# Patient Record
Sex: FEMALE | Race: WHITE | NOT HISPANIC OR LATINO
[De-identification: names, ages, dates, MRNs, and addresses within clinical notes are randomized per-mention and may not be internally consistent; named-entity substitution may affect disease eponyms.]

---

## 2018-04-18 ENCOUNTER — RECORDS - HEALTHEAST (OUTPATIENT)
Dept: ADMINISTRATIVE | Facility: OTHER | Age: 33
End: 2018-04-18

## 2018-04-27 ENCOUNTER — COMMUNICATION - HEALTHEAST (OUTPATIENT)
Dept: TELEHEALTH | Facility: CLINIC | Age: 33
End: 2018-04-27

## 2018-04-27 ENCOUNTER — HOSPITAL ENCOUNTER (OUTPATIENT)
Dept: MRI IMAGING | Facility: CLINIC | Age: 33
Discharge: HOME | End: 2018-04-27
Attending: PSYCHIATRY & NEUROLOGY

## 2018-04-27 DIAGNOSIS — G40.209 COMPLEX PARTIAL EPILEPSY WITHOUT STATUS EPILEPTICUS (H): ICD-10-CM

## 2018-07-08 ENCOUNTER — HOSPITAL ENCOUNTER (INPATIENT)
Dept: MEDSURG UNIT | Facility: CLINIC | Age: 33
Discharge: NURSING, REHAB OR LONG-TERM CARE | End: 2018-07-18
Attending: INTERNAL MEDICINE | Admitting: INTERNAL MEDICINE

## 2018-07-08 DIAGNOSIS — E87.1 HYPONATREMIA: ICD-10-CM

## 2018-07-08 DIAGNOSIS — A41.9 SEPSIS (H): ICD-10-CM

## 2018-07-08 DIAGNOSIS — R56.9 SEIZURES (H): ICD-10-CM

## 2018-07-08 DIAGNOSIS — N12 PYELONEPHRITIS: ICD-10-CM

## 2018-07-08 LAB
ABO/RH(D): NORMAL
ALBUMIN SERPL-MCNC: 3.5 G/DL (ref 3.5–5)
ALBUMIN UR-MCNC: ABNORMAL MG/DL
ALP SERPL-CCNC: 98 U/L (ref 45–120)
ALT SERPL W P-5'-P-CCNC: 15 U/L (ref 0–45)
AMPHETAMINES UR QL SCN: ABNORMAL
ANION GAP SERPL CALCULATED.3IONS-SCNC: 13 MMOL/L (ref 5–18)
ANTIBODY SCREEN: NEGATIVE
APPEARANCE UR: ABNORMAL
APTT PPP: 32 SECONDS (ref 24–37)
AST SERPL W P-5'-P-CCNC: 24 U/L (ref 0–40)
BACTERIA #/AREA URNS HPF: ABNORMAL HPF
BARBITURATES UR QL: ABNORMAL
BASOPHILS # BLD AUTO: 0 THOU/UL (ref 0–0.2)
BASOPHILS NFR BLD AUTO: 0 % (ref 0–2)
BENZODIAZ UR QL: ABNORMAL
BILIRUB SERPL-MCNC: 1.5 MG/DL (ref 0–1)
BILIRUB UR QL STRIP: NEGATIVE
BUN SERPL-MCNC: 9 MG/DL (ref 8–22)
CALCIUM SERPL-MCNC: 9.4 MG/DL (ref 8.5–10.5)
CANNABINOIDS UR QL SCN: ABNORMAL
CHLORIDE BLD-SCNC: 94 MMOL/L (ref 98–107)
CO2 SERPL-SCNC: 21 MMOL/L (ref 22–31)
COCAINE UR QL: ABNORMAL
COLOR UR AUTO: YELLOW
CREAT SERPL-MCNC: 0.78 MG/DL (ref 0.6–1.1)
CREAT UR-MCNC: 251.3 MG/DL
EOSINOPHIL # BLD AUTO: 0 THOU/UL (ref 0–0.4)
EOSINOPHIL NFR BLD AUTO: 0 % (ref 0–6)
ERYTHROCYTE [DISTWIDTH] IN BLOOD BY AUTOMATED COUNT: 13.2 % (ref 11–14.5)
ETHANOL SERPL-MCNC: <10 MG/DL
GFR SERPL CREATININE-BSD FRML MDRD: >60 ML/MIN/1.73M2
GLUCOSE BLD-MCNC: 140 MG/DL (ref 70–125)
GLUCOSE UR STRIP-MCNC: NEGATIVE MG/DL
HCG SERPL QL: NEGATIVE
HCT VFR BLD AUTO: 34.5 % (ref 35–47)
HGB BLD-MCNC: 11.7 G/DL (ref 12–16)
HGB UR QL STRIP: ABNORMAL
HYALINE CASTS #/AREA URNS LPF: ABNORMAL LPF
INR PPP: 1.24 (ref 0.9–1.1)
KETONES UR STRIP-MCNC: ABNORMAL MG/DL
LACTATE SERPL-SCNC: 0.6 MMOL/L (ref 0.5–2.2)
LACTATE SERPL-SCNC: 2.3 MMOL/L (ref 0.5–2.2)
LEUKOCYTE ESTERASE UR QL STRIP: NEGATIVE
LEVETIRACETAM (KEPPRA): <2 UG/ML (ref 6–46)
LIPASE SERPL-CCNC: <4 U/L (ref 0–52)
LYMPHOCYTES # BLD AUTO: 0.4 THOU/UL (ref 0.8–4.4)
LYMPHOCYTES NFR BLD AUTO: 3 % (ref 20–40)
MCH RBC QN AUTO: 29.6 PG (ref 27–34)
MCHC RBC AUTO-ENTMCNC: 33.9 G/DL (ref 32–36)
MCV RBC AUTO: 87 FL (ref 80–100)
METHADONE UR QL SCN: ABNORMAL
MONOCYTES # BLD AUTO: 0.3 THOU/UL (ref 0–0.9)
MONOCYTES NFR BLD AUTO: 3 % (ref 2–10)
MUCOUS THREADS #/AREA URNS LPF: ABNORMAL LPF
NEUTROPHILS # BLD AUTO: 10.5 THOU/UL (ref 2–7.7)
NEUTROPHILS NFR BLD AUTO: 94 % (ref 50–70)
NITRATE UR QL: NEGATIVE
OPIATES UR QL SCN: ABNORMAL
OXYCODONE UR QL: ABNORMAL
PCP UR QL SCN: ABNORMAL
PH UR STRIP: 6 [PH] (ref 4.5–8)
PLATELET # BLD AUTO: 133 THOU/UL (ref 140–440)
PMV BLD AUTO: 9.5 FL (ref 8.5–12.5)
POTASSIUM BLD-SCNC: 3.1 MMOL/L (ref 3.5–5)
PROCALCITONIN SERPL-MCNC: 4.1 NG/ML (ref 0–0.49)
PROT SERPL-MCNC: 7.8 G/DL (ref 6–8)
RBC # BLD AUTO: 3.95 MILL/UL (ref 3.8–5.4)
RBC #/AREA URNS AUTO: ABNORMAL HPF
SODIUM SERPL-SCNC: 128 MMOL/L (ref 136–145)
SP GR UR STRIP: 1.03 (ref 1–1.03)
SQUAMOUS #/AREA URNS AUTO: >100 LPF
UROBILINOGEN UR STRIP-ACNC: ABNORMAL
WBC #/AREA URNS AUTO: ABNORMAL HPF
WBC: 11.3 THOU/UL (ref 4–11)

## 2018-07-08 ASSESSMENT — MIFFLIN-ST. JEOR
SCORE: 1264.26
SCORE: 1250.2

## 2018-07-09 LAB
ALBUMIN SERPL-MCNC: 2.5 G/DL (ref 3.5–5)
ALP SERPL-CCNC: 83 U/L (ref 45–120)
ALT SERPL W P-5'-P-CCNC: 19 U/L (ref 0–45)
ANION GAP SERPL CALCULATED.3IONS-SCNC: 10 MMOL/L (ref 5–18)
AST SERPL W P-5'-P-CCNC: 46 U/L (ref 0–40)
ATRIAL RATE - MUSE: 143 BPM
BASOPHILS # BLD AUTO: 0 THOU/UL (ref 0–0.2)
BASOPHILS NFR BLD AUTO: 0 % (ref 0–2)
BILIRUB SERPL-MCNC: 1.1 MG/DL (ref 0–1)
BUN SERPL-MCNC: 8 MG/DL (ref 8–22)
CALCIUM SERPL-MCNC: 8 MG/DL (ref 8.5–10.5)
CHLORIDE BLD-SCNC: 101 MMOL/L (ref 98–107)
CO2 SERPL-SCNC: 20 MMOL/L (ref 22–31)
CREAT SERPL-MCNC: 0.62 MG/DL (ref 0.6–1.1)
DIASTOLIC BLOOD PRESSURE - MUSE: 84 MMHG
EOSINOPHIL COUNT (ABSOLUTE): 0 THOU/UL (ref 0–0.4)
EOSINOPHIL NFR BLD AUTO: 0 % (ref 0–6)
ERYTHROCYTE [DISTWIDTH] IN BLOOD BY AUTOMATED COUNT: 13.3 % (ref 11–14.5)
GFR SERPL CREATININE-BSD FRML MDRD: >60 ML/MIN/1.73M2
GLUCOSE BLD-MCNC: 103 MG/DL (ref 70–125)
HCT VFR BLD AUTO: 29 % (ref 35–47)
HGB BLD-MCNC: 9.6 G/DL (ref 12–16)
INTERPRETATION ECG - MUSE: NORMAL
LACTATE SERPL-SCNC: 0.7 MMOL/L (ref 0.5–2.2)
LYMPHOCYTES # BLD AUTO: 0.4 THOU/UL (ref 0.8–4.4)
LYMPHOCYTES NFR BLD AUTO: 5 % (ref 20–40)
MAGNESIUM SERPL-MCNC: 1.5 MG/DL (ref 1.8–2.6)
MCH RBC QN AUTO: 29.5 PG (ref 27–34)
MCHC RBC AUTO-ENTMCNC: 33.1 G/DL (ref 32–36)
MCV RBC AUTO: 89 FL (ref 80–100)
METAMYELOCYTES (ABSOLUTE): 0.3 THOU/UL
METAMYELOCYTES NFR BLD MANUAL: 4 %
MONOCYTES # BLD AUTO: 0.5 THOU/UL (ref 0–0.9)
MONOCYTES NFR BLD AUTO: 6 % (ref 2–10)
P AXIS - MUSE: 65 DEGREES
PLAT MORPH BLD: ABNORMAL
PLATELET # BLD AUTO: 97 THOU/UL (ref 140–440)
PMV BLD AUTO: 9.6 FL (ref 8.5–12.5)
POTASSIUM BLD-SCNC: 2.9 MMOL/L (ref 3.5–5)
POTASSIUM BLD-SCNC: 4.1 MMOL/L (ref 3.5–5)
PR INTERVAL - MUSE: 150 MS
PROT SERPL-MCNC: 5.9 G/DL (ref 6–8)
QRS DURATION - MUSE: 92 MS
QT - MUSE: 266 MS
QTC - MUSE: 410 MS
R AXIS - MUSE: -32 DEGREES
RBC # BLD AUTO: 3.25 MILL/UL (ref 3.8–5.4)
SODIUM SERPL-SCNC: 131 MMOL/L (ref 136–145)
SYSTOLIC BLOOD PRESSURE - MUSE: 133 MMHG
T AXIS - MUSE: 47 DEGREES
TOTAL NEUTROPHILS-ABS(DIFF): 7 THOU/UL (ref 2–7.7)
TOTAL NEUTROPHILS-REL(DIFF): 85 % (ref 50–70)
VENTRICULAR RATE- MUSE: 143 BPM
WBC: 8.2 THOU/UL (ref 4–11)

## 2018-07-10 LAB
ALBUMIN SERPL-MCNC: 2.4 G/DL (ref 3.5–5)
ALP SERPL-CCNC: 82 U/L (ref 45–120)
ALT SERPL W P-5'-P-CCNC: 31 U/L (ref 0–45)
ANION GAP SERPL CALCULATED.3IONS-SCNC: 7 MMOL/L (ref 5–18)
AORTIC ROOT: 2.9 CM
AORTIC VALVE MEAN VELOCITY: 74.8 CM/S
AST SERPL W P-5'-P-CCNC: 45 U/L (ref 0–40)
AV DIMENSIONLESS INDEX VTI: 0.9
AV MEAN GRADIENT: 3 MMHG
AV PEAK GRADIENT: 5.2 MMHG
AV VALVE AREA: 2.9 CM2
AV VELOCITY RATIO: 0.8
BACTERIA SPEC CULT: NORMAL
BASOPHILS # BLD AUTO: 0 THOU/UL (ref 0–0.2)
BASOPHILS NFR BLD AUTO: 0 % (ref 0–2)
BILIRUB DIRECT SERPL-MCNC: 0.2 MG/DL
BILIRUB SERPL-MCNC: 0.4 MG/DL (ref 0–1)
BSA FOR ECHO PROCEDURE: 1.6 M2
BUN SERPL-MCNC: 6 MG/DL (ref 8–22)
C REACTIVE PROTEIN LHE: 15 MG/DL (ref 0–0.8)
CALCIUM SERPL-MCNC: 8.3 MG/DL (ref 8.5–10.5)
CHLORIDE BLD-SCNC: 109 MMOL/L (ref 98–107)
CO2 SERPL-SCNC: 21 MMOL/L (ref 22–31)
CREAT SERPL-MCNC: 0.53 MG/DL (ref 0.6–1.1)
CV BLOOD PRESSURE: NORMAL MMHG
CV ECHO HEIGHT: 65 IN
CV ECHO WEIGHT: 130 LBS
DOP CALC AO PEAK VEL: 114 CM/S
DOP CALC AO VTI: 21.5 CM
DOP CALC LVOT AREA: 3.46 CM2
DOP CALC LVOT DIAMETER: 2.1 CM
DOP CALC LVOT PEAK VEL: 89.4 CM/S
DOP CALC LVOT STROKE VOLUME: 63.4 CM3
DOP CALCLVOT PEAK VEL VTI: 18.3 CM
ECHO EJECTION FRACTION ESTIMATED: 50 %
EJECTION FRACTION: 48 % (ref 55–75)
EOSINOPHIL # BLD AUTO: 0.1 THOU/UL (ref 0–0.4)
EOSINOPHIL NFR BLD AUTO: 1 % (ref 0–6)
ERYTHROCYTE [DISTWIDTH] IN BLOOD BY AUTOMATED COUNT: 13.4 % (ref 11–14.5)
FRACTIONAL SHORTENING: 31.6 % (ref 28–44)
GFR SERPL CREATININE-BSD FRML MDRD: >60 ML/MIN/1.73M2
GLUCOSE BLD-MCNC: 101 MG/DL (ref 70–125)
HCT VFR BLD AUTO: 31.9 % (ref 35–47)
HGB BLD-MCNC: 10.1 G/DL (ref 12–16)
INTERVENTRICULAR SEPTUM IN END DIASTOLE: 0.89 CM (ref 0.6–0.9)
IVS/PW RATIO: 0.9
LA AREA 1: 10 CM2
LA AREA 2: 16 CM2
LEFT ATRIUM LENGTH: 3.6 CM
LEFT ATRIUM SIZE: 2.5 CM
LEFT ATRIUM VOLUME INDEX: 23.6 ML/M2
LEFT ATRIUM VOLUME: 37.8 ML
LEFT VENTRICLE CARDIAC INDEX: 3.6 L/MIN/M2
LEFT VENTRICLE CARDIAC OUTPUT: 5.7 L/MIN
LEFT VENTRICLE DIASTOLIC VOLUME INDEX: 45.6 CM3/M2 (ref 34–74)
LEFT VENTRICLE DIASTOLIC VOLUME: 72.9 CM3 (ref 46–106)
LEFT VENTRICLE HEART RATE: 90 BPM
LEFT VENTRICLE MASS INDEX: 101.3 G/M2
LEFT VENTRICLE SYSTOLIC VOLUME INDEX: 23.5 CM3/M2 (ref 11–31)
LEFT VENTRICLE SYSTOLIC VOLUME: 37.6 CM3 (ref 14–42)
LEFT VENTRICULAR INTERNAL DIMENSION IN DIASTOLE: 4.87 CM (ref 3.8–5.2)
LEFT VENTRICULAR INTERNAL DIMENSION IN SYSTOLE: 3.33 CM (ref 2.2–3.5)
LEFT VENTRICULAR MASS: 162.1 G
LEFT VENTRICULAR OUTFLOW TRACT MEAN GRADIENT: 2 MMHG
LEFT VENTRICULAR OUTFLOW TRACT MEAN VELOCITY: 58.2 CM/S
LEFT VENTRICULAR OUTFLOW TRACT PEAK GRADIENT: 3 MMHG
LEFT VENTRICULAR POSTERIOR WALL IN END DIASTOLE: 1.01 CM (ref 0.6–0.9)
LV STROKE VOLUME INDEX: 39.6 ML/M2
LYMPHOCYTES # BLD AUTO: 0.9 THOU/UL (ref 0.8–4.4)
LYMPHOCYTES NFR BLD AUTO: 14 % (ref 20–40)
MCH RBC QN AUTO: 29.3 PG (ref 27–34)
MCHC RBC AUTO-ENTMCNC: 31.7 G/DL (ref 32–36)
MCV RBC AUTO: 93 FL (ref 80–100)
MITRAL VALVE DECELERATION SLOPE: 5930 MM/S2
MITRAL VALVE DECELERATION SLOPE: 5930 MM/S2
MITRAL VALVE E/A RATIO: 1
MONOCYTES # BLD AUTO: 0.4 THOU/UL (ref 0–0.9)
MONOCYTES NFR BLD AUTO: 6 % (ref 2–10)
MV AVERAGE E/E' RATIO: 8.9 CM/S
MV E'TISSUE VEL-LAT: 11.5 CM/S
MV E'TISSUE VEL-MED: 7.16 CM/S
MV LATERAL E/E' RATIO: 7.3
MV MEDIAL E/E' RATIO: 11.6
MV PEAK A VELOCITY: 80 CM/S
MV PEAK E VELOCITY: 83.4 CM/S
NEUTROPHILS # BLD AUTO: 4.9 THOU/UL (ref 2–7.7)
NEUTROPHILS NFR BLD AUTO: 79 % (ref 50–70)
NUC REST DIASTOLIC VOLUME INDEX: 2084.8 LBS
NUC REST SYSTOLIC VOLUME INDEX: 65 IN
PLATELET # BLD AUTO: 112 THOU/UL (ref 140–440)
PMV BLD AUTO: 10.1 FL (ref 8.5–12.5)
POTASSIUM BLD-SCNC: 3.7 MMOL/L (ref 3.5–5)
PROT SERPL-MCNC: 5.6 G/DL (ref 6–8)
RBC # BLD AUTO: 3.45 MILL/UL (ref 3.8–5.4)
SODIUM SERPL-SCNC: 137 MMOL/L (ref 136–145)
TRICUSPID REGURGITATION PEAK PRESSURE GRADIENT: 14 MMHG
TRICUSPID VALVE ANULAR PLANE SYSTOLIC EXCURSION: 2.2 CM
TRICUSPID VALVE PEAK REGURGITANT VELOCITY: 187 CM/S
WBC: 6.2 THOU/UL (ref 4–11)

## 2018-07-11 LAB
AEROBIC BLOOD CULTURE BOTTLE: ABNORMAL
ALBUMIN SERPL-MCNC: 2.7 G/DL (ref 3.5–5)
ALP SERPL-CCNC: 83 U/L (ref 45–120)
ALT SERPL W P-5'-P-CCNC: 26 U/L (ref 0–45)
ANAEROBIC BLOOD CULTURE BOTTLE: ABNORMAL
ANION GAP SERPL CALCULATED.3IONS-SCNC: 7 MMOL/L (ref 5–18)
AST SERPL W P-5'-P-CCNC: 16 U/L (ref 0–40)
BASOPHILS # BLD AUTO: 0 THOU/UL (ref 0–0.2)
BASOPHILS NFR BLD AUTO: 0 % (ref 0–2)
BILIRUB DIRECT SERPL-MCNC: 0.1 MG/DL
BILIRUB SERPL-MCNC: 0.3 MG/DL (ref 0–1)
BUN SERPL-MCNC: 4 MG/DL (ref 8–22)
CALCIUM SERPL-MCNC: 9 MG/DL (ref 8.5–10.5)
CHLORIDE BLD-SCNC: 108 MMOL/L (ref 98–107)
CO2 SERPL-SCNC: 23 MMOL/L (ref 22–31)
CREAT SERPL-MCNC: 0.54 MG/DL (ref 0.6–1.1)
EOSINOPHIL # BLD AUTO: 0.1 THOU/UL (ref 0–0.4)
EOSINOPHIL NFR BLD AUTO: 1 % (ref 0–6)
ERYTHROCYTE [DISTWIDTH] IN BLOOD BY AUTOMATED COUNT: 13.5 % (ref 11–14.5)
GFR SERPL CREATININE-BSD FRML MDRD: >60 ML/MIN/1.73M2
GLUCOSE BLD-MCNC: 101 MG/DL (ref 70–125)
HCT VFR BLD AUTO: 33.1 % (ref 35–47)
HGB BLD-MCNC: 11 G/DL (ref 12–16)
LYMPHOCYTES # BLD AUTO: 1.2 THOU/UL (ref 0.8–4.4)
LYMPHOCYTES NFR BLD AUTO: 23 % (ref 20–40)
MAGNESIUM SERPL-MCNC: 1.7 MG/DL (ref 1.8–2.6)
MCH RBC QN AUTO: 29.5 PG (ref 27–34)
MCHC RBC AUTO-ENTMCNC: 33.2 G/DL (ref 32–36)
MCV RBC AUTO: 89 FL (ref 80–100)
MONOCYTES # BLD AUTO: 0.4 THOU/UL (ref 0–0.9)
MONOCYTES NFR BLD AUTO: 8 % (ref 2–10)
NEUTROPHILS # BLD AUTO: 3.5 THOU/UL (ref 2–7.7)
NEUTROPHILS NFR BLD AUTO: 68 % (ref 50–70)
PLATELET # BLD AUTO: 157 THOU/UL (ref 140–440)
PMV BLD AUTO: 10.2 FL (ref 8.5–12.5)
POTASSIUM BLD-SCNC: 3.4 MMOL/L (ref 3.5–5)
PROT SERPL-MCNC: 6.6 G/DL (ref 6–8)
RBC # BLD AUTO: 3.73 MILL/UL (ref 3.8–5.4)
SODIUM SERPL-SCNC: 138 MMOL/L (ref 136–145)
WBC: 5.2 THOU/UL (ref 4–11)

## 2018-07-11 ASSESSMENT — MIFFLIN-ST. JEOR: SCORE: 1257.91

## 2018-07-12 LAB
AEROBIC BLOOD CULTURE BOTTLE: ABNORMAL
ANAEROBIC BLOOD CULTURE BOTTLE: ABNORMAL
ANION GAP SERPL CALCULATED.3IONS-SCNC: 8 MMOL/L (ref 5–18)
BACTERIA SPEC CULT: ABNORMAL
BACTERIA SPEC CULT: ABNORMAL
BASOPHILS # BLD AUTO: 0 THOU/UL (ref 0–0.2)
BASOPHILS NFR BLD AUTO: 0 % (ref 0–2)
BSA FOR ECHO PROCEDURE: 1.6 M2
BUN SERPL-MCNC: 7 MG/DL (ref 8–22)
CALCIUM SERPL-MCNC: 9.9 MG/DL (ref 8.5–10.5)
CHLORIDE BLD-SCNC: 105 MMOL/L (ref 98–107)
CO2 SERPL-SCNC: 24 MMOL/L (ref 22–31)
CREAT SERPL-MCNC: 0.57 MG/DL (ref 0.6–1.1)
CV BLOOD PRESSURE: NORMAL MMHG
CV ECHO HEIGHT: 65 IN
CV ECHO WEIGHT: 122 LBS
ECHO EJECTION FRACTION ESTIMATED: 60 %
EOSINOPHIL COUNT (ABSOLUTE): 0.1 THOU/UL (ref 0–0.4)
EOSINOPHIL NFR BLD AUTO: 2 % (ref 0–6)
ERYTHROCYTE [DISTWIDTH] IN BLOOD BY AUTOMATED COUNT: 13.5 % (ref 11–14.5)
GFR SERPL CREATININE-BSD FRML MDRD: >60 ML/MIN/1.73M2
GLUCOSE BLD-MCNC: 101 MG/DL (ref 70–125)
GRAM STAIN RESULT: ABNORMAL
GRAM STAIN RESULT: ABNORMAL
HCT VFR BLD AUTO: 36 % (ref 35–47)
HGB BLD-MCNC: 11.9 G/DL (ref 12–16)
LACTATE SERPL-SCNC: 1.7 MMOL/L (ref 0.5–2.2)
LEFT VENTRICLE HEART RATE: 109 BPM
LYMPHOCYTES # BLD AUTO: 1.9 THOU/UL (ref 0.8–4.4)
LYMPHOCYTES NFR BLD AUTO: 27 % (ref 20–40)
MCH RBC QN AUTO: 29.3 PG (ref 27–34)
MCHC RBC AUTO-ENTMCNC: 33.1 G/DL (ref 32–36)
MCV RBC AUTO: 89 FL (ref 80–100)
MONOCYTES # BLD AUTO: 0.6 THOU/UL (ref 0–0.9)
MONOCYTES NFR BLD AUTO: 9 % (ref 2–10)
NUC REST DIASTOLIC VOLUME INDEX: 1947.2 LBS
NUC REST SYSTOLIC VOLUME INDEX: 65 IN
PLAT MORPH BLD: NORMAL
PLATELET # BLD AUTO: 204 THOU/UL (ref 140–440)
PMV BLD AUTO: 9.6 FL (ref 8.5–12.5)
POTASSIUM BLD-SCNC: 4.2 MMOL/L (ref 3.5–5)
RBC # BLD AUTO: 4.06 MILL/UL (ref 3.8–5.4)
REACTIVE LYMPHS: ABNORMAL
SODIUM SERPL-SCNC: 137 MMOL/L (ref 136–145)
TOTAL NEUTROPHILS-ABS(DIFF): 4.3 THOU/UL (ref 2–7.7)
TOTAL NEUTROPHILS-REL(DIFF): 62 % (ref 50–70)
VANCOMYCIN SERPL-MCNC: 5.6 UG/ML
WBC: 6.9 THOU/UL (ref 4–11)

## 2018-07-13 LAB
ANION GAP SERPL CALCULATED.3IONS-SCNC: 9 MMOL/L (ref 5–18)
BUN SERPL-MCNC: 9 MG/DL (ref 8–22)
CALCIUM SERPL-MCNC: 9.5 MG/DL (ref 8.5–10.5)
CHLORIDE BLD-SCNC: 106 MMOL/L (ref 98–107)
CO2 SERPL-SCNC: 24 MMOL/L (ref 22–31)
CREAT SERPL-MCNC: 0.6 MG/DL (ref 0.6–1.1)
GFR SERPL CREATININE-BSD FRML MDRD: >60 ML/MIN/1.73M2
GLUCOSE BLD-MCNC: 105 MG/DL (ref 70–125)
MAGNESIUM SERPL-MCNC: 2 MG/DL (ref 1.8–2.6)
POTASSIUM BLD-SCNC: 4 MMOL/L (ref 3.5–5)
SODIUM SERPL-SCNC: 139 MMOL/L (ref 136–145)

## 2018-07-14 LAB
AEROBIC BLOOD CULTURE BOTTLE: ABNORMAL
ANAEROBIC BLOOD CULTURE BOTTLE: NO GROWTH
ANION GAP SERPL CALCULATED.3IONS-SCNC: 10 MMOL/L (ref 5–18)
BACTERIA SPEC CULT: ABNORMAL
BUN SERPL-MCNC: 9 MG/DL (ref 8–22)
CALCIUM SERPL-MCNC: 9.4 MG/DL (ref 8.5–10.5)
CHLORIDE BLD-SCNC: 105 MMOL/L (ref 98–107)
CO2 SERPL-SCNC: 26 MMOL/L (ref 22–31)
CREAT SERPL-MCNC: 0.66 MG/DL (ref 0.6–1.1)
GFR SERPL CREATININE-BSD FRML MDRD: >60 ML/MIN/1.73M2
GLUCOSE BLD-MCNC: 113 MG/DL (ref 70–125)
GRAM STAIN RESULT: ABNORMAL
POTASSIUM BLD-SCNC: 3.9 MMOL/L (ref 3.5–5)
SODIUM SERPL-SCNC: 141 MMOL/L (ref 136–145)

## 2018-07-15 LAB
ANION GAP SERPL CALCULATED.3IONS-SCNC: 10 MMOL/L (ref 5–18)
BUN SERPL-MCNC: 9 MG/DL (ref 8–22)
CALCIUM SERPL-MCNC: 9.5 MG/DL (ref 8.5–10.5)
CHLORIDE BLD-SCNC: 104 MMOL/L (ref 98–107)
CO2 SERPL-SCNC: 26 MMOL/L (ref 22–31)
CREAT SERPL-MCNC: 0.63 MG/DL (ref 0.6–1.1)
GFR SERPL CREATININE-BSD FRML MDRD: >60 ML/MIN/1.73M2
GLUCOSE BLD-MCNC: 97 MG/DL (ref 70–125)
POTASSIUM BLD-SCNC: 3.9 MMOL/L (ref 3.5–5)
SODIUM SERPL-SCNC: 140 MMOL/L (ref 136–145)

## 2018-07-16 LAB
AEROBIC BLOOD CULTURE BOTTLE: NO GROWTH
ANAEROBIC BLOOD CULTURE BOTTLE: NO GROWTH
ANION GAP SERPL CALCULATED.3IONS-SCNC: 8 MMOL/L (ref 5–18)
BUN SERPL-MCNC: 8 MG/DL (ref 8–22)
CALCIUM SERPL-MCNC: 9.5 MG/DL (ref 8.5–10.5)
CHLORIDE BLD-SCNC: 105 MMOL/L (ref 98–107)
CO2 SERPL-SCNC: 26 MMOL/L (ref 22–31)
CREAT SERPL-MCNC: 0.6 MG/DL (ref 0.6–1.1)
GFR SERPL CREATININE-BSD FRML MDRD: >60 ML/MIN/1.73M2
GLUCOSE BLD-MCNC: 110 MG/DL (ref 70–125)
POTASSIUM BLD-SCNC: 3.9 MMOL/L (ref 3.5–5)
SODIUM SERPL-SCNC: 139 MMOL/L (ref 136–145)

## 2018-07-17 LAB
AEROBIC BLOOD CULTURE BOTTLE: ABNORMAL
AEROBIC BLOOD CULTURE BOTTLE: NO GROWTH
ANAEROBIC BLOOD CULTURE BOTTLE: NO GROWTH
ANAEROBIC BLOOD CULTURE BOTTLE: NO GROWTH
ANION GAP SERPL CALCULATED.3IONS-SCNC: 7 MMOL/L (ref 5–18)
BACTERIA SPEC CULT: ABNORMAL
BUN SERPL-MCNC: 9 MG/DL (ref 8–22)
CALCIUM SERPL-MCNC: 9.5 MG/DL (ref 8.5–10.5)
CHLORIDE BLD-SCNC: 104 MMOL/L (ref 98–107)
CO2 SERPL-SCNC: 29 MMOL/L (ref 22–31)
CREAT SERPL-MCNC: 0.67 MG/DL (ref 0.6–1.1)
GFR SERPL CREATININE-BSD FRML MDRD: >60 ML/MIN/1.73M2
GLUCOSE BLD-MCNC: 104 MG/DL (ref 70–125)
GRAM STAIN RESULT: ABNORMAL
POTASSIUM BLD-SCNC: 3.8 MMOL/L (ref 3.5–5)
SODIUM SERPL-SCNC: 140 MMOL/L (ref 136–145)

## 2018-07-18 LAB
AEROBIC BLOOD CULTURE BOTTLE: NO GROWTH
ANAEROBIC BLOOD CULTURE BOTTLE: NO GROWTH
ANION GAP SERPL CALCULATED.3IONS-SCNC: 6 MMOL/L (ref 5–18)
BUN SERPL-MCNC: 8 MG/DL (ref 8–22)
CALCIUM SERPL-MCNC: 9.7 MG/DL (ref 8.5–10.5)
CHLORIDE BLD-SCNC: 108 MMOL/L (ref 98–107)
CO2 SERPL-SCNC: 26 MMOL/L (ref 22–31)
CREAT SERPL-MCNC: 0.54 MG/DL (ref 0.6–1.1)
GFR SERPL CREATININE-BSD FRML MDRD: >60 ML/MIN/1.73M2
GLUCOSE BLD-MCNC: 112 MG/DL (ref 70–125)
POTASSIUM BLD-SCNC: 3.8 MMOL/L (ref 3.5–5)
SODIUM SERPL-SCNC: 140 MMOL/L (ref 136–145)

## 2018-07-19 LAB
AEROBIC BLOOD CULTURE BOTTLE: NO GROWTH
ANAEROBIC BLOOD CULTURE BOTTLE: NO GROWTH

## 2018-07-20 LAB
AEROBIC BLOOD CULTURE BOTTLE: ABNORMAL
ANAEROBIC BLOOD CULTURE BOTTLE: NO GROWTH

## 2018-07-22 LAB
BACTERIA SPEC CULT: ABNORMAL
GRAM STAIN RESULT: ABNORMAL

## 2018-09-06 ENCOUNTER — AMBULATORY - HEALTHEAST (OUTPATIENT)
Dept: BEHAVIORAL HEALTH | Facility: CLINIC | Age: 33
End: 2018-09-06

## 2018-09-06 ENCOUNTER — COMMUNICATION - HEALTHEAST (OUTPATIENT)
Dept: BEHAVIORAL HEALTH | Facility: CLINIC | Age: 33
End: 2018-09-06

## 2018-09-06 DIAGNOSIS — F11.20 OPIOID USE DISORDER, SEVERE, DEPENDENCE (H): ICD-10-CM

## 2018-09-11 ENCOUNTER — COMMUNICATION - HEALTHEAST (OUTPATIENT)
Dept: BEHAVIORAL HEALTH | Facility: CLINIC | Age: 33
End: 2018-09-11

## 2018-12-18 ENCOUNTER — COMMUNICATION - HEALTHEAST (OUTPATIENT)
Dept: ADMINISTRATIVE | Facility: CLINIC | Age: 33
End: 2018-12-18

## 2019-01-15 ENCOUNTER — TELEPHONE (OUTPATIENT)
Dept: FAMILY MEDICINE | Facility: CLINIC | Age: 34
End: 2019-01-15

## 2021-06-01 VITALS — HEIGHT: 65 IN | BODY MASS INDEX: 20.28 KG/M2 | WEIGHT: 121.7 LBS

## 2021-06-16 PROBLEM — I07.9 ENDOCARDITIS OF TRICUSPID VALVE: Status: ACTIVE | Noted: 2019-08-07

## 2021-06-16 PROBLEM — F11.20 OPIOID USE DISORDER, SEVERE, DEPENDENCE (H): Status: ACTIVE | Noted: 2018-07-31

## 2021-06-16 PROBLEM — Z87.898 HISTORY OF PROLONGED Q-T INTERVAL ON ECG: Status: ACTIVE | Noted: 2018-12-04

## 2021-06-16 PROBLEM — F06.31 MOOD DISORDER W/MAJOR DEPRESSIVE-LIKE EPISODE DUE TO MEDICAL CONDITION: Chronic | Status: ACTIVE | Noted: 2018-12-24

## 2021-06-16 PROBLEM — I26.90 SEPTIC PULMONARY EMBOLISM (H): Status: ACTIVE | Noted: 2018-12-04

## 2021-06-16 PROBLEM — F33.1 MAJOR DEPRESSIVE DISORDER, RECURRENT EPISODE, MODERATE (H): Status: ACTIVE | Noted: 2019-01-02

## 2021-06-16 PROBLEM — F41.1 GAD (GENERALIZED ANXIETY DISORDER): Status: ACTIVE | Noted: 2018-12-26

## 2021-06-16 PROBLEM — F15.20 SEVERE AMPHETAMINE SUBSTANCE USE DISORDER (H): Chronic | Status: ACTIVE | Noted: 2018-12-24

## 2021-06-16 PROBLEM — F33.0 MDD (MAJOR DEPRESSIVE DISORDER), RECURRENT EPISODE, MILD (H): Status: ACTIVE | Noted: 2019-02-12

## 2021-06-16 PROBLEM — F32.A DEPRESSION: Chronic | Status: ACTIVE | Noted: 2018-12-24

## 2021-06-16 PROBLEM — A41.02 SEPSIS DUE TO METHICILLIN RESISTANT STAPHYLOCOCCUS AUREUS (MRSA) (H): Status: ACTIVE | Noted: 2018-12-04

## 2021-06-16 PROBLEM — G47.00 INSOMNIA, UNSPECIFIED: Status: ACTIVE | Noted: 2018-12-04

## 2021-06-16 PROBLEM — K21.9 GERD WITHOUT ESOPHAGITIS: Status: ACTIVE | Noted: 2018-12-04

## 2021-06-16 PROBLEM — L40.9 PSORIASIS: Status: ACTIVE | Noted: 2018-12-04

## 2021-06-19 NOTE — CONSULTS
GENERAL SURGERY CONSULTATION    Lucy Hernández  [facility]  Medical Record #:  [id number]  YOB: 1985  Age:  32 y.o.     Date of Consultation: 7/15/2018    Reason for Consultation: Cellulitis right arm    Lucy Hernández is a 32 y.o. female who presents with a history of intravenous drug abuse and with noted swelling and pain in the inside of her right arm yesterday.  At the current time she seen the general care [facility] setting.  She has been using intravenous drugs in the left arm for approximately 2 weeks before admission to the hospital early last week.  She was admitted with seizure history and fevers.  Current record is reviewed.  Patient has not had a sepsis previously to the best of her knowledge she has not had infection in the intravenous drug use sites.  She has undergone treatment approximately 5-8 years ago.  This was for opiate use.    PHH:    Past Medical History:   Diagnosis Date     Methamphetamine abuse     injectable method     Psoriasis      Pyelonephritis      Seizures (H)      Tobacco abuse      UTI (urinary tract infection)         Past Surgical History:   Procedure Laterality Date     PICC  7/14/2018            ALLERGIES:  Review of patient's allergies indicates no known allergies.    MEDS:    Current Facility-Administered Medications:      acetaminophen suppository 650 mg (TYLENOL), 650 mg, Rectal, Q6H PRN, Guillermo Mcclain MD     acetaminophen tablet 500-1,000 mg (TYLENOL), 500-1,000 mg, Oral, Q4H PRN, Guillermo Mcclain MD, 1,000 mg at 07/09/18 0330     aluminum-magnesium hydroxide-simethicone 200-200-20 mg/5 mL suspension 30 mL (MAALOX ADVANCED), 30 mL, Oral, Q4H PRN, Guillermo Mcclain MD     aspirin EC tablet 81 mg, 81 mg, Oral, DAILY, Latrice Polanco MD, 81 mg at 07/15/18 1512     bisacodyl suppository 10 mg (DULCOLAX), 10 mg, Rectal, Daily PRN, Guillermo Mcclain MD     cloNIDine HCl tablet 0.1 mg (CATAPRES), 0.1 mg, Oral, Q6H PRN, Nadir Arita DO     diazePAM tablet 10  mg (VALIUM), 10 mg, Oral, Q4H PRN, Nadir Arita DO, 10 mg at 07/15/18 1159     gabapentin capsule 300 mg (NEURONTIN), 300 mg, Oral, TID, Lauren K Quick, CNP, 300 mg at 07/15/18 1512     HYDROmorphone injection 0.2-0.4 mg (DILAUDID), 0.2-0.4 mg, Intravenous, Q4H PRN, Guillermo Mcclain MD     hydrOXYzine pamoate capsule 25 mg (VISTARIL), 25 mg, Oral, TID, Lauren K Quick, CNP, 25 mg at 07/15/18 1512     lacosamide tablet 50 mg (VIMPAT), 50 mg, Oral, BID, Nadir Arita DO, 50 mg at 07/15/18 0914     lamoTRIgine tablet 25 mg (LaMICtal), 25 mg, Oral, DAILY, Lauren K Quick, CNP, 25 mg at 07/15/18 0914     LORazepam 1 mg injection (diluted concentration), 1 mg, Intravenous, Q8H PRN, Nadir Arita DO, 1 mg at 07/10/18 1124     magnesium hydroxide suspension 30 mL (MILK OF MAG), 30 mL, Oral, Daily PRN, Guillermo Mcclain MD     melatonin tablet 3 mg, 3 mg, Oral, Bedtime PRN, Tyler Bonilla MD, 3 mg at 07/14/18 2256     nafcillin (UNIPEN) continuous infusion, 0.5 g/hr, Intravenous, Continuous, Jim Banks MD, Last Rate: 20.8 mL/hr at 07/15/18 0520, 0.5 g/hr at 07/15/18 0520     naloxone injection 0.2-0.4 mg (NARCAN), 0.2-0.4 mg, Intravenous, PRN **OR** naloxone injection 0.2-0.4 mg (NARCAN), 0.2-0.4 mg, Intramuscular, PRN, Guillermo Mcclain MD     ondansetron injection 4 mg (ZOFRAN), 4 mg, Intravenous, Q4H PRN **OR** ondansetron tablet 8 mg (ZOFRAN), 8 mg, Oral, Q8H PRN, Guillermo Mcclain MD     sodium chloride flush 10-20 mL (NS), 10-20 mL, Intravenous, PRN, Latrice Polanco MD     sodium chloride flush 10-30 mL (NS), 10-30 mL, Intravenous, PRN, Latrice Polanco MD     sodium chloride flush 10-30 mL (NS), 10-30 mL, Intravenous, Q8H FIXED TIMES, Latrice Polanco MD, 10 mL at 07/15/18 1513     sodium chloride flush 20 mL (NS), 20 mL, Intravenous, PRN, Latrice Polanco MD    SOCIAL HABITS:    History   Smoking Status     Current Every Day Smoker     Packs/day: 0.50   Smokeless Tobacco     Not on file      History   Alcohol Use     0.5 - 1.0 oz/week     1 - 2 Standard drinks or equivalent per week     Comment: 1-2 every few weeks     History   Drug Use     Yes     Comment: opiates       FAMILY HISTORY:  No family history on file.    REVIEW OF SYSTEMS: Noted    PE:    Vitals:    07/15/18 1524   BP: 118/83   Pulse: (!) 110   Resp: 16   Temp: 99  F (37.2  C)   SpO2: 100%       HEENT: Normal  Chest: Normal  Lungs: Clear  Heart: Heart rate 80/min regular  Abd: Benign  Ext: Right upper extremity forearm ulnar side of significant cellulitis and cord like structure consistent with thrombo-phlebitis septic.  Edges of the erythema are marked.  Vascular: Normal    LABS:    Results from last 7 days  Lab Units 07/15/18  0515   LN-SODIUM mmol/L 140   LN-POTASSIUM mmol/L 3.9   LN-CHLORIDE mmol/L 104   LN-CO2 mmol/L 26   LN-BLOOD UREA NITROGEN mg/dL 9   LN-CREATININE mg/dL 0.63   LN-CALCIUM mg/dL 9.5      Results from last 7 days  Lab Units 07/12/18  0757 07/11/18  0728   LN-WHITE BLOOD CELL COUNT thou/uL 6.9 5.2   LN-HEMOGLOBIN g/dL 11.9* 11.0*   LN-HEMATOCRIT % 36.0 33.1*   LN-PLATELET COUNT thou/uL 204 157   LN-NEUTROPHILS RELATIVE PERCENT %  --  68   LN-LYMPHOCYTES RELATIVE PERCENT %  --  23   LN-LYMPHOCYTES - REL (DIFF) % 27  --    LN-MONOCYTES RELATIVE PERCENT %  --  8   LN-MONOCYTES - REL (DIFF) % 9  --    LN-EOSINOPHILS RELATIVE PERCENT %  --  1      Results from last 7 days  Lab Units 07/11/18  0728   LN-ALKALINE PHOSPHATASE U/L 83   LN-BILIRUBIN TOTAL mg/dL 0.3   LN-BILIRUBIN DIRECT mg/dL 0.1   LN-PROTEIN TOTAL g/dL 6.6   LN-ALT (SGPT) U/L 26   LN-AST (SGOT) U/L 16           Results from last 7 days  Lab Units 07/08/18  1957   LN-INR  1.24*       XRAYS:    ASSESSMENT: Septic thrombophlebitis on antibiotics she reports this is improving since yesterday    PLAN: Patient has plans for transfer to inpatient treatment program at [facility] and I will see her there tomorrow.  She has been informed about the potential need for  resection of this vein depending upon the clinical course.    Lucius mak md  [facility]

## 2021-06-19 NOTE — DISCHARGE SUMMARY
[place] MEDICINE  DISCHARGE SUMMARY     Primary Care Physician: No Primary Care Provider  Admission Date: 7/8/2018   Discharge Provider: Laureen Jose Discharge Date: 7/18/2018   Diet: regular diet   Code Status: Full Code   Activity: activity as tolerated        Condition at Discharge: Good      REASON FOR PRESENTATION(See Admission Note for Details)   Fever and seizure     PRINCIPAL & ACTIVE DISCHARGE DIAGNOSES       IVDU/polysubstance abuse  MSSA bacteremia  Superficial phlebitis with clot in the right forearm    Seizure disorder   Encephalopathy due to polysubstance withdrawal     Principal Problem:    Sepsis (H)  Active Problems:    Methamphetamine abuse    Partial symptomatic epilepsy with complex partial seizures, not intractable, without status epilepticus (H)    Polysubstance abuse    Opioid abuse with intoxication delirium (H)    Adjustment disorder with mixed anxiety and depressed mood    Substance induced mood disorder (H)      SIGNIFICANT FINDINGS (Imaging, labs):       PENDING LABS      Order Current Status    Culture, Blood In process    Culture, Blood In process          PROCEDURES ( this hospitalization only)          DISPOSITION     [facility]    SUMMARY OF HOSPITAL COURSE:    32-year-old female with history of seizure disorder, tobacco abuse, IV drug user presented to ER with seizure, nausea and vomiting and fever 102. Urine drug screen was positive for amphetamine, methadone, opiates and marijuana.  Chest x-ray was negative, CT head and cervical spine was negative. UA unremarkable. BC grew MSSA from 7/9-7/11. HARVEY was negative. PICC line placed and she started on nafcillin IV. She discharged to [place] to complete the treatment course. Ideally she need IV antibiotic until 8/9 which may not be practical. She can get IV treatment at least until 7/26 followed by Keflex 1000 mg PO four times a day for two weeks per ID recommendations.    Patient developed superficial  thrombophlebitis with infiltrated IV ampicillin in right forearm, started on aspirin 81 mg daily. Of note, patient has seizure disorder, non-compliant with her medications         Discharge Medications with Med changes:        Medication List      CONTINUE taking these medications          lamoTRIgine 25 MG tablet   Dose:  25 mg   Commonly known as:  LaMICtal   25 mg, Oral, DAILY             Immunizations given this encounter         Discharge Orders       Discharge Orders  Patients may NOT drive themselves home or drive motorized vehicles until the next day       Examination     Vital Signs in last 24 hours:   Temp:  [98.3  F (36.8  C)-98.7  F (37.1  C)] 98.7  F (37.1  C)  Heart Rate:  [] 87  Resp:  [16-18] 16  BP: (100-118)/(59-82) 104/71  SpO2:  [97 %-100 %] 100 %   General Appearance:  Alert, cooperative, no distress, anxious, in tears   Head:  Normocephalic, atraumatic   Eyes:  PERRL    Neck: No JVD, no LAP   Lungs:   Clear to auscultation bilaterally, respirations unlabored   Chest Wall:  No tenderness or deformity   Heart:  Regular rate and rhythm, S1, S2    Abdomen:   Soft, non tender, non distended, bowel sounds present, no guarding or rigidity   Extremities: there is linear induration noted along the right medial forearm, erythema completely resolved       Skin: Skin color, texture, turgor normal   Neurologic: Alert and oriented X 3, Moves all 4 extremities         Please see EMR for more detailed significant labs, imaging, consultant notes etc.  Total time spent on discharge: 40  minutes    Laureen Jose MD   [facility]: Ph:[phone]    CC:No Primary Care Provider

## 2021-06-19 NOTE — PROGRESS NOTES
7:38 AM    Cm had a message from Kika Winfield asking for the CM to give a call on the referral for the Pt. CM called back and got the voicemail. CM will follow up again this morning.     CARL Simpson

## 2021-06-19 NOTE — PROGRESS NOTES
Infectious Diseases Progress Note  [facility]    Date of visit: 2018    ASSESSMENT:  1. MSSA bacteremia.  Blood cultures on positive from -.  Repeat cultures pending.  Initial Vanco trough showing sub-therapeutic levels.  HARVEY negative for vegetations on . Treat SA bacteremia with IV antibiotics, switching or oral antibiotics increases risk of relapse.   2. IV drug abuse.  Active drug use complicates needed IV antibiotic therapy  3. History of seizures    PLAN:  1. Nafcillin 2 g IV every 4 hours, can be run as continuous infusion 12gm/24hours--at least until .  Could consider changing to po abx at that point--cephalexin 1000 mg po four times a day for two weeks..  Optimal would be to keep on IV treatment until --but may not be practicable.  2. I would not recommend PICC line or home IV therapy due to active drug use, but due to difficult IV access she can get single lumen PICC anytime to be used in controlled setting (hospital), best to keep nafcillin as continuous infusion so it is running at all times.   Pawel Morales MD  [facility]    On-Call ID provider: [phone], option: 9    ==================================================================    SUBJECTIVE / INTERVAL HISTORY:  Anxious about transferring to [facility].  Doesn't know what to expect.  Complains of mild loose stool      ROS:   Afebrile, no rashes    Current Antimicrobials:  Nafcillin    Previous:  Zosyn -7/10  vancomycin      OBJECTIVE:  Vitals:    18 0738   BP: 98/65   Pulse: 88   Resp: 18   Temp: 98.1  F (36.7  C)   SpO2: 100%       Temp (24hrs), Av.7  F (37.1  C), Min:98.1  F (36.7  C), Max:99  F (37.2  C)        Intake/Output Summary (Last 24 hours) at 18 1245  Last data filed at 18 1048   Gross per 24 hour   Intake           6.4 ml   Output                0 ml   Net           2026.4 ml        GENERAL:  Flat affect  HENT:  Head is normocephalic,  atraumatic.   EYES:  Eyes have anicteric sclerae.    LUNGS:  Nl respiratory pattern  CARDIOVASCULAR:  Regular rate and rhythm with no murmurs, gallops or rubs.  ABDOMEN:  Normal bowel sounds, soft, nontender.  EXT: tender spot right arm, previous septic thrombophlebitis.  SKIN:  No acute rashes.  Track marks  NEUROLOGIC:  Grossly nonfocal.        Pertinent labs:      Results from last 7 days  Lab Units 07/12/18  0757 07/11/18  0728 07/10/18  0330   LN-WHITE BLOOD CELL COUNT thou/uL 6.9 5.2 6.2   LN-HEMOGLOBIN g/dL 11.9* 11.0* 10.1*   LN-PLATELET COUNT thou/uL 204 157 112*   LN-MONOCYTES RELATIVE PERCENT %  --  8 6   LN-MONOCYTES - REL (DIFF) % 9  --   --          Results from last 7 days  Lab Units 07/16/18  0550 07/15/18  0515 07/14/18  0415  07/11/18  0728 07/10/18  0330   LN-SODIUM mmol/L 139 140 141  < > 138 137   LN-POTASSIUM mmol/L 3.9 3.9 3.9  < > 3.4* 3.7   LN-CHLORIDE mmol/L 105 104 105  < > 108* 109*   LN-CO2 mmol/L 26 26 26  < > 23 21*   LN-BLOOD UREA NITROGEN mg/dL 8 9 9  < > 4* 6*   LN-CREATININE mg/dL 0.60 0.63 0.66  < > 0.54* 0.53*   LN-CALCIUM mg/dL 9.5 9.5 9.4  < > 9.0 8.3*   LN-ALBUMIN g/dL  --   --   --   --  2.7* 2.4*   LN-PROTEIN TOTAL g/dL  --   --   --   --  6.6 5.6*   LN-BILIRUBIN TOTAL mg/dL  --   --   --   --  0.3 0.4   LN-ALKALINE PHOSPHATASE U/L  --   --   --   --  83 82   LN-ALT (SGPT) U/L  --   --   --   --  26 31   LN-AST (SGOT) U/L  --   --   --   --  16 45*   < > = values in this interval not displayed.      MICROBIOLOGY DATA:  7/9 blood culture: MSSA  7/10 blood cultures ×2: MSSA  7/11 blood cultures ×2: MSSA  7/12-15 ngtd    IMAGING/RADIOLOGY:  No results found.        Attestation:  I have reviewed today's Medications, Vital Signs, and Labs.

## 2021-06-19 NOTE — PROGRESS NOTES
Hospital Medicine Progress Note:    Summary:  32-year-old female with history of seizure disorder presented with complaints of seizure.  Also complained of having fever, headache, nausea vomiting.    Evaluation with temperature of 103, heart rate initially 147, respirations 30, blood pressure and oxygenation normal.  Labs with sodium 128, potassium 3.1, normal creatinine.  Lactic acid mildly elevated at 2.3, pro calcitonin elevated at 4.1.  White count minimally elevated at 11.3, hemoglobin 11.7, platelets slightly low at 133.  Transaminases normal.  Urine drug screen positive for amphetamines, methadone, opiates, marijuana.  Urinalysis with bacteria, pyuria, grossly contaminated however.  HCG negative.  Chest x-ray negative.  CT head and cervical spine negative.  Renal ultrasound unremarkable with exception of incidental note of mild splenomegaly.    Blood cultures positive for gram-positive cocci in clusters, 1/2 samples, possible contaminant, patient did admit to some IV drug use or injecting methamphetamine.  Added vancomycin while cultures pending, ordered a transthoracic echocardiogram.  Patient also appear to be withdrawing, unclear if she is withdrawing from opiates or alcohol.  Treated with high doses of benzodiazepine with improvement in her symptoms.    Assessment:  1. Seizure, noncompliance with antiepileptic medications  2. Fever, nausea, vomiting, lactic acidosis, unclear source, urine culture negative  3. Positive preliminary blood cultures, IV drug use  4. Urine drug screen positive for multiple substances including methadone, opiates, THC, opiate withdrawal possible  5. Withdrawal, possibly alcohol versus methamphetamine versus opiate    Plan:  1. Continue Zosyn, vancomycin  2. Transthoracic echocardiogram  3. Continue IV fluids, decrease rate to 75 mils per hour  4. Trend white count, liver function tests, blood culture  5. Continue Vimpat 50mg p.o. two times a day for now  6. Consult infectious  disease  7. Check daily blood cultures  8. Seizure precautions  9. Appreciate psychiatry recommendations, gabapentin 300 mg 3 times daily added, Vistaril added, Lamictal added, scheduled Ativan added, Seroquel added  10. Avoid opiates, treat withdrawal symptoms with clonidine if blood pressure allows or diazepam  11. If urine culture polymicrobial or negative and liver function tests elevated will consider viral studies and tickborne studies.      VTE prophylaxis: Early ambulation or SCDs  IV fluids: Decrease rate to 75 mils per hour  Diet: Regular  GI prophylaxis: None  Pain meds: Tylenol, Toradol, Dilaudid as needed  Therapies: None  Barriers to discharge: Urine culture, medical stability  Disposition: Stay today    Subjective:  Patient is sedated at the time of my encounter and unable to provide history.    Physical Exam:    Constitutional: no acute distress, comfortable, sedated  Eyes: no scleral icterus  ENT: moist oral mucosa  Respiratory: lungs are clear without wheezing, crackles, breathing comfortably  CV: regular rate and rhythm, no significant peripheral pitting edema, mildly tachycardic at rest  GI: abdomen is soft, non-tender, non-distended with active bowel sounds.    MSK: no obvious swelling, warmth, erythema of joints  Skin: warm, dry  Psych: Sedated, unable to assess    DO [facility]  Pager #: [phone]

## 2021-06-19 NOTE — PROGRESS NOTES
Infectious Diseases Progress Note  [facility]    Date of visit: 2018    ASSESSMENT:  1. MSSA bacteremia.  Blood cultures on positive from -.  Repeat cultures pending.  Initial Vanco trough showing sub-therapeutic levels.  HARVEY negative for vegetations on . Treat SA bacteremia with IV antibiotics, switching or oral antibiotics increases risk of relapse.   2. IV drug abuse.  Active drug use complicates needed IV antibiotic therapy  3. History of seizures    PLAN:  1. Nafcillin 2 g IV every 4 hours, can be run as continuous infusion 12gm/24hours.  2. Daily blood cultures to ensure clearance  3. I would not recommend PICC line or home IV therapy due to active drug use, but due to difficult IV access she can get single lumen PICC anytime to be used in controlled setting (hospital), best to keep nafcillin as continuous infusion so it is running at all times.   4. Looking into chem dep unit at [facility] but won't know if this will work out until .   5. Please call over weekend if further questions.    Jim Banks MD  [facility]  Direct messaging: [email or web address] Paging  On-Call ID provider: [phone], option: 9    ==================================================================    SUBJECTIVE / INTERVAL HISTORY:  Wants to go to chem dep treatment. Difficult IV access. Temps better, feels better.       ROS:   Afebrile, no rashes    Current Antimicrobials:  Nafcillin    Previous:  Zosyn -7/10  vancomycin      OBJECTIVE:  Vitals:    18 0752   BP: 116/75   Pulse: 99   Resp: 16   Temp: 97.9  F (36.6  C)   SpO2: 99%       Temp (24hrs), Av.2  F (36.8  C), Min:97.9  F (36.6  C), Max:98.4  F (36.9  C)        Intake/Output Summary (Last 24 hours) at 18 9337  Last data filed at 18 0649   Gross per 24 hour   Intake             1130 ml   Output                0 ml   Net             1130 ml        GENERAL:  Flat affect  HENT:  Head is normocephalic,  atraumatic.   EYES:  Eyes have anicteric sclerae.    LUNGS:  Nl respiratory pattern  CARDIOVASCULAR:  Regular rate and rhythm with no murmurs, gallops or rubs.  ABDOMEN:  Normal bowel sounds, soft, nontender.  EXT: Extremities warm and without edema.  SKIN:  No acute rashes.  Track marks  NEUROLOGIC:  Grossly nonfocal.        Pertinent labs:      Results from last 7 days  Lab Units 07/12/18  0757 07/11/18  0728 07/10/18  0330   LN-WHITE BLOOD CELL COUNT thou/uL 6.9 5.2 6.2   LN-HEMOGLOBIN g/dL 11.9* 11.0* 10.1*   LN-PLATELET COUNT thou/uL 204 157 112*   LN-MONOCYTES RELATIVE PERCENT %  --  8 6   LN-MONOCYTES - REL (DIFF) % 9  --   --          Results from last 7 days  Lab Units 07/14/18  0415 07/13/18  0525 07/12/18  0757 07/11/18  0728 07/10/18  0330  07/09/18  0341   LN-SODIUM mmol/L 141 139 137 138 137  --  131*   LN-POTASSIUM mmol/L 3.9 4.0 4.2 3.4* 3.7  < > 2.9*   LN-CHLORIDE mmol/L 105 106 105 108* 109*  --  101   LN-CO2 mmol/L 26 24 24 23 21*  --  20*   LN-BLOOD UREA NITROGEN mg/dL 9 9 7* 4* 6*  --  8   LN-CREATININE mg/dL 0.66 0.60 0.57* 0.54* 0.53*  --  0.62   LN-CALCIUM mg/dL 9.4 9.5 9.9 9.0 8.3*  --  8.0*   LN-ALBUMIN g/dL  --   --   --  2.7* 2.4*  --  2.5*   LN-PROTEIN TOTAL g/dL  --   --   --  6.6 5.6*  --  5.9*   LN-BILIRUBIN TOTAL mg/dL  --   --   --  0.3 0.4  --  1.1*   LN-ALKALINE PHOSPHATASE U/L  --   --   --  83 82  --  83   LN-ALT (SGPT) U/L  --   --   --  26 31  --  19   LN-AST (SGOT) U/L  --   --   --  16 45*  --  46*   < > = values in this interval not displayed.      MICROBIOLOGY DATA:  7/9 blood culture: MSSA  7/10 blood cultures ×2: MSSA  7/11 blood cultures ×2: MSSA  7/12-14 ngtd    IMAGING/RADIOLOGY:  No results found.        Attestation:  I have reviewed today's Medications, Vital Signs, and Labs.

## 2021-06-19 NOTE — CONSULTS
Consultation - Infectious Disease  [facility]  Lucy Hernández,  1985, MRN [id number]    Admitting Dx: Hyponatremia [E87.1]  Seizures (H) [R56.9]  Pyelonephritis [N12]  Sepsis (H) [A41.9]    PCP: No Primary Care Provider, None       ASSESSMENT   32-year-old woman with a history of seizure disorder and polysubstance abuse who is admitted with fevers.  1 of 1 blood culture on admission is positive for coagulase positive staph, likely staph aureus.  This is a concerning finding in an IV drug user, bring up the possibility of metastatic infection such as endocarditis.  Repeat cultures have been negative.  TTE is without vegetations.  Patient will need HARVEY to rule out endocarditis.    Principal Problem:    Sepsis (H)  Active Problems:    Methamphetamine abuse    Partial symptomatic epilepsy with complex partial seizures, not intractable, without status epilepticus (H)    Polysubstance abuse    Opioid abuse with intoxication delirium (H)    Adjustment disorder with mixed anxiety and depressed mood    Substance induced mood disorder (H)       PLAN   -Discontinue Zosyn  -Continue vancomycin, pending culture susceptibilities  -HARVEY  -encourage treatment for substance abuse        Thank you for this consult. Will follow.    Yousif Simons MD  [facility]  Direct messaging: [phone] Paging  On-Call ID provider: [phone], option: [phone]    ===========================================      Chief Complaint   Sepsis (H)       HPI     We have been requested by Latrice Polanco MD to evaluate Lucy Hernández for the above.    History was obtained from patient and past medical records.    Lucy Hernández is a 32 y.o. woman with a history of seizure disorder and polysubstance abuse who is admitted with fevers.  She also reportedly had a seizure prior to admission.  Admission drug screen was positive for amphetamines, methadone, opiates, and marijuana.  The patient does say that she has used IV drugs  within the past month.  She denies any recent skin lesions.  Today she denies any headaches, chest pain, shortness of breath, back pain, abdominal pain, joint pains, or rash.  Blood culture on admission is growing coagulase positive staph.  Repeat blood cultures have been negative.  TTE was done yesterday which was negative for vegetations.        Medical History  Active Ambulatory (Non-Hospital) Problems    Diagnosis     Pyelonephritis, acute     Tobacco abuse     Past Medical History:   Diagnosis Date     Methamphetamine abuse      Psoriasis      Pyelonephritis      Seizures (H)      Tobacco abuse      UTI (urinary tract infection)     Surgical History  She  has no past surgical history on file.     Social History  Reviewed, and she  reports that she has been smoking.  She has been smoking about 0.50 packs per day. She does not have any smokeless tobacco history on file. She reports that she drinks about 0.5 - 1.0 oz of alcohol per week  She reports that she uses illicit drugs.  Social History     Social History Narrative    Lives with father.      Family History    No recent family infections           Allergies   No Known Allergies      Antibiotics   Vancomycin 7/10-  Zosyn 7/8-    Previous:  None    Review of Systems   Ten systems reviewed and negative except for what is noted in the HPI     Physical Exam     Temp:  [98.1  F (36.7  C)-99  F (37.2  C)] 98.1  F (36.7  C)  Heart Rate:  [] 84  Resp:  [16-18] 18  BP: ()/(70-80) 115/71    Vitals:    07/11/18 0727   BP: 115/71   Pulse: 84   Resp: 18   Temp: 98.1  F (36.7  C)   SpO2: 99%       GENERAL:  well-developed, well-nourished, sitting in bed in no acute distress.   HENT:  Head is normocephalic, atraumatic. Oropharynx is moist without exudates or ulcers. Decaying upper molars, limited exam due to patient participation  EYES:  Eyes have anicteric sclerae without conjunctival injection or stigmata of endocarditis.   NECK:  Supple.  LUNGS:  Clear to  auscultation.  CARDIOVASCULAR:  Regular rate and rhythm with no murmurs, gallops or rubs.  ABDOMEN:  Normal bowel sounds, soft, nontender. No appreciable hepatosplenomegaly  EXT: Extremities warm and without edema.  SKIN:  No acute rashes. No stigmata of endocarditis.  NEUROLOGIC:  Grossly nonfocal.      Laboratory results     Results from last 7 days  Lab Units 07/11/18  0728 07/10/18  0330 07/09/18  0341   LN-WHITE BLOOD CELL COUNT thou/uL 5.2 6.2 8.2   LN-HEMOGLOBIN g/dL 11.0* 10.1* 9.6*   LN-HEMATOCRIT % 33.1* 31.9* 29.0*   LN-PLATELET COUNT thou/uL 157 112* 97*          Results from last 7 days  Lab Units 07/11/18  0728 07/10/18  0330  07/09/18  0341   LN-SODIUM mmol/L 138 137  --  131*   LN-POTASSIUM mmol/L 3.4* 3.7  < > 2.9*   LN-CHLORIDE mmol/L 108* 109*  --  101   LN-CO2 mmol/L 23 21*  --  20*   LN-BLOOD UREA NITROGEN mg/dL 4* 6*  --  8   LN-CREATININE mg/dL 0.54* 0.53*  --  0.62   < > = values in this interval not displayed.    Lab Results   Component Value Date    ALT 26 07/11/2018    AST 16 07/11/2018    ALKPHOS 83 07/11/2018    BILITOT 0.3 07/11/2018       Lab Results   Component Value Date    CRP 15.0 (H) 07/10/2018             Cultures   7/9 blood culture: Coagulase positive staph  7/10 blood cultures ×2: No growth to date  7/11 blood cultures ×2: No growth to date      Imaging   Radiology results reviewed    Xr Chest 2 Views    Result Date: 7/8/2018  XR CHEST 2 VIEWS 7/8/2018 8:31 PM INDICATION: Fever. COMPARISON: None. FINDINGS: Negative chest.    Ct Head Without Contrast    Result Date: 7/8/2018  [facility] CT HEAD WO CONTRAST, CT CERVICAL SPINE WO CONTRAST 7/8/2018 8:47 PM INDICATION: Traumatic head and neck injury. TECHNIQUE: HEAD CT: Routine. Dose reduction techniques were used. CERVICAL SPINE CT: Axial images were obtained through the cervical spine and were evaluated in the axial plane with sagittal and coronal reformations. Dose reduction techniques were used. CONTRAST: None.  COMPARISON:  Head MRI 04/27/2018 FINDINGS: HEAD CT: No intracranial hemorrhage, extraaxial collection, mass effect or CT evidence of acute infarct.  Normal parenchymal density for age. The ventricles and sulci are normal for age. Osseous structures are intact. The visualized orbits, paranasal sinuses and mastoid air cells are free of significant disease. CERVICAL SPINE CT: Normal vertebral body heights. No fracture or post-traumatic subluxation. No high-grade spinal canal or neural foraminal stenosis. Grossly normal paraspinal soft tissues. Normal lung apices.     Ct Cervical Spine Without Contrast    Result Date: 7/8/2018  [facility] CT HEAD WO CONTRAST, CT CERVICAL SPINE WO CONTRAST 7/8/2018 8:47 PM INDICATION: Traumatic head and neck injury. TECHNIQUE: HEAD CT: Routine. Dose reduction techniques were used. CERVICAL SPINE CT: Axial images were obtained through the cervical spine and were evaluated in the axial plane with sagittal and coronal reformations. Dose reduction techniques were used. CONTRAST: None. COMPARISON:  Head MRI 04/27/2018 FINDINGS: HEAD CT: No intracranial hemorrhage, extraaxial collection, mass effect or CT evidence of acute infarct.  Normal parenchymal density for age. The ventricles and sulci are normal for age. Osseous structures are intact. The visualized orbits, paranasal sinuses and mastoid air cells are free of significant disease. CERVICAL SPINE CT: Normal vertebral body heights. No fracture or post-traumatic subluxation. No high-grade spinal canal or neural foraminal stenosis. Grossly normal paraspinal soft tissues. Normal lung apices.     Us Kidney Bilateral    Result Date: 7/9/2018  US KIDNEY BILATERAL WITH BLADDER 7/9/2018 9:58 AM INDICATION: Septic/uti - check for stones, hydroneph or renal abscess TECHNIQUE: Routine kidneys and bladder. COMPARISON: None. FINDINGS: Right kidney measures 11.4 x 5.6 x 4.8 cm. No hydronephrosis. No mass. Normal renal echotexture. Left kidney  measures 11.8 x 5.2 x 5.9 cm. No hydronephrosis. No mass. Normal renal echotexture. Bladder is normal. Incidentally noted is that the spleen is in the upper range of normal in size.       Attestation:  I have reviewed today's Medications, Vital Signs, Imaging, and Labs. Recommendations discussed with the primary service.

## 2021-06-19 NOTE — PROGRESS NOTES
Infectious Disease Chart Check    Awaiting placement after discharge.  Follow up blood cultures 7/12 remain  negative to date         ASSESSMENT:  1. MSSA bacteremia.  Blood cultures on positive from 7/9-7/11.  Repeat cultures pending.  Initial Vanco trough showing sub-therapeutic levels.  HARVEY negative for vegetations on 7/12. Treat SA bacteremia with IV antibiotics, switching or oral antibiotics increases risk of relapse.   2. IV drug abuse.  Active drug use complicates needed IV antibiotic therapy  3. History of seizures     PLAN:  1. Nafcillin 2 g IV every 4 hours, can be run as continuous infusion 12gm/24hours--at least until 7/26.  Could consider changing to po abx at that point--cephalexin 1000 mg po four times a day for two weeks..  Optimal would be to keep on IV treatment until 8/9--but may not be practicable.  2. I would not recommend PICC line or home IV therapy due to active drug use, but due to difficult IV access she can get single lumen PICC anytime to be used in controlled setting (hospital), best to keep nafcillin as continuous infusion so it is running at all times.     Pawel Morales MD

## 2021-06-19 NOTE — PROGRESS NOTES
Spiritual Care Note    Spiritual Assessment: Chart reads Confucianist. When asked about having a Higher Power or God, pt said no. Pt has received communion. Response likely indicative of emotional confusion as to how to better manage her life. Pt does acknowledge need to have friends that are not using and who are supportive of recovery.    Care Provided: Reinforced and affirmed pt's statement of wanting to receive chemical dependency tx and mental health hospitalization. Pt would like to be in patient at .     Plan of Care: Our department will follow as needed or as requested.    JESSICA Stanley.

## 2021-06-19 NOTE — PROGRESS NOTES
[facility] Daily Progress Note   Assessment/Plan:    32 year old female with history of seizure, tobacco abuse IV drug abuse presented to ER with seizure and she was diagnos with sepsis bacteremia    MSSA bacteremia   Negative HARVEY, positive BC from 7/9-7/11  On nafcillin IV  PICC line place, history of IVDU    She will be transferred to inpatient psych facility     Superficial phlebitis with clot in the right forearm  Had infiltrated IV ampicillin in the area 3 days ago  Surgery consulted  On aspirin 81 mg daily  Surgery is following, appreciate recommendations     Seizure disorder   On lamotrigine, resume       Barriers to discharge. Awaiting transfer    Anticipated discharge/disposition. Tomorrow to [facility]        LOS: 8 days     Subjective:  Feels well  Denies any pain   afebrile    aspirin  81 mg Oral DAILY     gabapentin  300 mg Oral TID     hydrOXYzine pamoate  25 mg Oral TID     lacosamide  50 mg Oral BID     lamoTRIgine  25 mg Oral DAILY     sodium chloride  10-30 mL Intravenous Q8H FIXED TIMES       Objective:  Vital signs in last 24 hours:  Temp:  [98.1  F (36.7  C)-99  F (37.2  C)] 98.1  F (36.7  C)  Heart Rate:  [] 88  Resp:  [16-18] 18  BP: ()/(61-83) 98/65  Weight:   121 lb 11.2 oz (55.2 kg)    Intake/Output last 3 shifts:  I/O last 3 completed shifts:  In: 1860 [P.O.:1860]  Out: -   Intake/Output this shift:       Review of Systems:   As per subjective, all others negative.    Physical Exam:    General Appearance:  Alert, cooperative, no distress   Head:  Normocephalic, atraumatic   Eyes:  PERRL    Neck: No JVD, no LAP   Lungs:   Clear to auscultation bilaterally, respirations unlabored   Chest Wall:  No tenderness or deformity   Heart:  Regular rate and rhythm, S1, S2 normal,no murmur   Abdomen:   Soft, non tender, non distended, bowel sounds present, no guarding or rigidity   Extremities: Erythema is receding around the line in forearm, there is linear induration noted along the right  medial forearm      Skin: Skin color, texture, turgor normal   Neurologic: Alert and oriented X 3, Moves all 4 extremities     Imaging:  Us Venous Arm Right  Result Date: 7/15/2018  CONCLUSION: 1.  The right arm veins are negative for DVT. 2.  Superficial peripheral clot in the right forearm.    Lab Results:  Personally Reviewed.   Lab Results   Component Value Date    WBC 6.9 07/12/2018    HGB 11.9 (L) 07/12/2018    HCT 36.0 07/12/2018    MCV 89 07/12/2018     07/12/2018     Lab Results   Component Value Date    CREATININE 0.60 07/16/2018    BUN 8 07/16/2018     07/16/2018    K 3.9 07/16/2018     07/16/2018    CO2 26 07/16/2018       Total time spent was greater than 35 minutes, greater that 50% of this time was spent in direct evaluation of patient.    Laureen Jose M.D  [facility] Service  Internal Medicine

## 2021-06-19 NOTE — ED PROVIDER NOTES
eMERGENCY dEPARTMENT eNCOUnter      CHIEF COMPLAINT    Chief Complaint   Patient presents with     Seizures     Fever     Headache     Emesis       ED COURSE & MEDICAL DECISION MAKING    7:35 PM I met with the patient to perform a physical exam and discuss plans for care.   8:14 PM Updated patient on results and discussed her last prescription fill in April. Told her that if she is not taking her medications properly then she will have more frequent seizures. Her dad says that his girlfriend is at home and has her pill bottle and notes that she has taken very few pills of a 60 day supply.  9:00 PM I talked with Dr. Mcclain about admitting patient and she agrees.   9:10 PM I went to recheck with patient and update her on results and discussed results with the patient.   9:43 PM I talked with Dr. Han with neurology about patient. He recommended to give Keppra 1 g IV. If she were to react to it poorly (as she says her psoriasis flares and affects her whole body from Keppra), he recommends alternating to Vimpat 100 mg IV loading dose and transition to 50 mg two times a day PO.   9:50 PM I updated the patient on further plans.     Patient with a history of seizures that started in 2016 presents after a seizure.  This was witnessed by her father's girlfriend.  The patient dropped to the ground and had tonic-clonic movements for an estimated 2 minutes.  She was difficult to arouse for the 5 minutes following that.  This happened about an hour ago, and on her arrival, the patient continues to not be able to fully answer my questions.  In reviewing the patient's records and speaking with the patient's father, the patient is noncompliant with her medications for seizures.  She also has a fever of 10 3 F.  She has a history of pyelonephritis with sepsis.  Patient likely has pyelonephritis this with sepsis today.  She is tachycardic, her lactate is elevated at 2.3, she is febrile.  Patient treated with IV hydration, IV  antibiotics to treat for such.  Patient also found to be hyponatremic.  Neurology was consulted, they recommended loading the patient with Keppra, she is unable to tolerate Keppra, he therefore recommended loading her with Vimpat.  Patient's urine drug screen is positive for amphetamines, opiates, methadone, THC.  The patient's father is also concerned that the patient drinks alcohol on a regular basis.  EtOH is negative today.  Patient will be admitted for pyelonephritis with sepsis and management of her seizures and hyponatremia.    Pertinent Labs & Imaging studies reviewed. (See chart for details)  Results for orders placed or performed during the hospital encounter of 07/08/18   Comprehensive Metabolic Panel   Result Value Ref Range    Sodium 128 (L) 136 - 145 mmol/L    Potassium 3.1 (L) 3.5 - 5.0 mmol/L    Chloride 94 (L) 98 - 107 mmol/L    CO2 21 (L) 22 - 31 mmol/L    Anion Gap, Calculation 13 5 - 18 mmol/L    Glucose 140 (H) 70 - 125 mg/dL    BUN 9 8 - 22 mg/dL    Creatinine 0.78 0.60 - 1.10 mg/dL    GFR MDRD Af Amer >60 >60 mL/min/1.73m2    GFR MDRD Non Af Amer >60 >60 mL/min/1.73m2    Bilirubin, Total 1.5 (H) 0.0 - 1.0 mg/dL    Calcium 9.4 8.5 - 10.5 mg/dL    Protein, Total 7.8 6.0 - 8.0 g/dL    Albumin 3.5 3.5 - 5.0 g/dL    Alkaline Phosphatase 98 45 - 120 U/L    AST 24 0 - 40 U/L    ALT 15 0 - 45 U/L   APTT   Result Value Ref Range    PTT 32 24 - 37 seconds   INR   Result Value Ref Range    INR 1.24 (H) 0.90 - 1.10   Lactic Acid   Result Value Ref Range    Lactic Acid 2.3 (H) 0.5 - 2.2 mmol/L   Type and Screen   Result Value Ref Range    ABORh A POS     Antibody Screen Negative Negative   Urinalysis-UC if Indicated   Result Value Ref Range    Color, UA Yellow Colorless, Yellow, Straw, Light Yellow    Clarity, UA Hazy (!) Clear    Glucose, UA Negative Negative    Bilirubin, UA Negative Negative    Ketones,  mg/dL (!) Negative    Specific Gravity, UA 1.030 1.001 - 1.030    Blood, UA Small (!) Negative     pH, UA 6.0 4.5 - 8.0    Protein, UA >=500 mg/dL (!) Negative mg/dL    Urobilinogen, UA <2.0 E.U./dL <2.0 E.U./dL, 2.0 E.U./dL    Nitrite, UA Negative Negative    Leukocytes, UA Negative Negative    Bacteria, UA Many (!) None Seen hpf    RBC, UA 0-2 None Seen, 0-2 hpf    WBC, UA 10-25 (!) None Seen, 0-5 hpf    Squam Epithel, UA >100 (!) None Seen, 0-5 lpf    Mucus, UA Many (!) None Seen lpf    Hyaline Casts, UA  (!) 0-5, None Seen lpf   Drug Abuse 1+, Urine   Result Value Ref Range    Amphetamines (!) Screen Negative     Screen Positive (Confirmation available on request)    Benzodiazepines Screen Negative Screen Negative    Opiates (!) Screen Negative     Screen Positive (Confirmation available on request)    Phencyclidine Screen Negative Screen Negative    THC (!) Screen Negative     Screen Positive (Confirmation available on request)    Barbiturates Screen Negative Screen Negative    Cocaine Metabolite Screen Negative Screen Negative    Methadone (!) Screen Negative     Screen Positive (Confirmation available on request)    Oxycodone Screen Negative Screen Negative    Creatinine, Urine 251.3 mg/dL   ETOH Level   Result Value Ref Range    Alcohol, Blood <10 None detected mg/dL   HCG, Qual   Result Value Ref Range    Beta hCG Qualitative Negative Negative   HM1 (CBC with Diff)   Result Value Ref Range    WBC 11.3 (H) 4.0 - 11.0 thou/uL    RBC 3.95 3.80 - 5.40 mill/uL    Hemoglobin 11.7 (L) 12.0 - 16.0 g/dL    Hematocrit 34.5 (L) 35.0 - 47.0 %    MCV 87 80 - 100 fL    MCH 29.6 27.0 - 34.0 pg    MCHC 33.9 32.0 - 36.0 g/dL    RDW 13.2 11.0 - 14.5 %    Platelets 133 (L) 140 - 440 thou/uL    MPV 9.5 8.5 - 12.5 fL    Neutrophils % 94 (H) 50 - 70 %    Lymphocytes % 3 (L) 20 - 40 %    Monocytes % 3 2 - 10 %    Eosinophils % 0 0 - 6 %    Basophils % 0 0 - 2 %    Neutrophils Absolute 10.5 (H) 2.0 - 7.7 thou/uL    Lymphocytes Absolute 0.4 (L) 0.8 - 4.4 thou/uL    Monocytes Absolute 0.3 0.0 - 0.9 thou/uL    Eosinophils  "Absolute 0.0 0.0 - 0.4 thou/uL    Basophils Absolute 0.0 0.0 - 0.2 thou/uL       HPI    I, June Walker, am serving as a scribe to document services personally performed by Cecille Hyde MD, based on my observations and the provider's statements to me.    Lucy Hernández is a 32 y.o. female with a history of seizures, pyelonephritis, tobacco abuse and opioid abuse who presents to the emergency department by ambulence for evaluation of a seizure.    Upon arrival, the patient did not remember much about what just happened. She complains of bilateral leg pain, a headache, nausea and vomiting that began 2-3 days ago. Her temperature was 103 degrees and she rates her headache at a 7-8/10. She is on seizure medication but cannot recall what her medication is called. She denies drinking and notes her last drink was the 4th of July and she only had a few. She also notes she smokes \"a little\", does not use drugs, denies diarrhea, urination problems, cough, rhinorrhea, or abdominal pain.    Per her father, she has had 5 seizures, her first being 2 years ago but she was not diagnosed with them. She just started to see an EEG and went in for an MRI. About the incident, he was not there but his girlfriend was. She noted that at 6:30 PM, the patient was standing and crying, staring off into space when she fell over, hit the right side of her head/body, her lips turned blue, her breathing was labored and her full body was shaking. This lasted a couple minutes and she was unconscious for it. He also states that it has never taken this long for the patient to come back to reality. She says she has been sick for 2 days now with vomiting and a fever. He also notes she is going though a break up and has been drinking a lot more lately, almost daily and can get very intoxicated at times. He requests a drug and alcohol test for her. She is supposed to be on Keppra 500 mg PID. She last filled her levetiracetam on 07/17/17 and her " "lamotrigine on 04/19/18.     Per chart review, the patient's first seizure was 03/02/16. The physician noted all work up pointed to signs of a seizure. She was given a card to follow-up with neurology within 1 week and discharged. Her next seizure was on 11/10/16 and had bit her tongue and strained her back. Her imaging studies came back unremarkable. After speaking with neurology, she was started on Keppra and asked to follow up with EEG/MRI and neurology. Her next seizure was 07/17/17 and she was supposed to be on Keppra 500 mg PID, but lost her medication 2 weeks prior and did not refill it. Her work-up was unremarkable and after Naprosyn and Keppra, she was feeling better and discharged with 1 month supply prescription for Keppra and recommended follow up with neuro. She had another seizure on 04/11/18, diagnosed with chronic seizure disorder and noncompliant with medication. She had been off her medication for the last 8 months per her report because of a \"reaction\" to Keppra and psorias. She had never followed up with neuro for a medication change. She refused medication at ED. She made an appointment with neuro for 2 weeks after this visit. Her next seizure was 04/19/18 and she seemed clear headed and normal upon examination. Her plan was to start Dilantin after seeing her neurologist the day prior to the ER visit.     Social Hx: Opioid and alcohol abuse.   Primary care provider: No Primary Care Provider    I, Cecille Hyde MD, attest that June Velazquezreinier is acting in a scribe capacity, has observed my performance of the services and has documented them in accordance with my direction.      PAST MEDICAL HISTORY    Past Medical History:   Diagnosis Date     Psoriasis      Pyelonephritis      Seizures (H)      Tobacco abuse      UTI (urinary tract infection)        SURGICAL HISTORY    No past surgical history on file.    CURRENT MEDICATIONS    Patient's Medications   New Prescriptions    No medications on " "file   Previous Medications    LAMOTRIGINE (LAMICTAL) 25 MG TABLET    Take 25 mg by mouth daily.   Modified Medications    No medications on file   Discontinued Medications    No medications on file       ALLERGIES    No Known Allergies    FAMILY HISTORY    No family history on file.    SOCIAL HISTORY    Social History     Social History     Marital status: Single     Spouse name: N/A     Number of children: 3     Years of education: N/A     Occupational History     /[employer]     Social History Main Topics     Smoking status: Current Every Day Smoker     Packs/day: 0.50     Smokeless tobacco: Not on file     Alcohol use 0.5 - 1.0 oz/week     1 - 2 Standard drinks or equivalent per week      Comment: 1-2 every few weeks     Drug use: No     Sexual activity: Yes     Partners: Male     Birth control/ protection: Condom     Other Topics Concern     Not on file     Social History Narrative    Lives with father.        REVIEW OF SYSTEMS    Constitutional:  Positive for fever  HENT:  Denies sore throat, rhinorrhea. Positive for injury to right side of head   Respiratory:  Denies cough, shortness of breath  Cardiovascular:  Denies lightheadedness, chest pain  GI:  Denies abdominal pain, diarrhea or constipation. Positive for nausea and vomiting.   : Denies urination problems   Musculoskeletal:  Denies joint pain.  Has normal range of motion. Bilateral lower extremity pain.   Skin:  Denies rash   Neurologic:  Positive fore seizure, headache, LOC    All systems negative except as marked.     PHYSICAL EXAM    VITAL SIGNS: /61  Pulse (!) 112  Temp 98.8  F (37.1  C) (Oral)   Resp (!) 30  Ht 5' 5\" (1.651 m)  Wt 120 lb (54.4 kg)  LMP 06/08/2018  SpO2 98%  BMI 19.97 kg/m2   Gen:  Alert, awake, NAD  HENT:  PERRL.  EOMI.  No periorbital step-offs, depression, tenderness.  No tenderness along the zygomatic arch bilaterally.  Ears atraumatic with no external bleeding or signs of trauma.  No " epistaxis.  Clear oropharynx.  Dentition intact. Small hematoma of right temple   Respiratory:  Normal respiratory rate.  Lungs CTA.  Chest wall stable to compression.  Nontender chest wall.   Trachea midline.  Cardiovascular:  Tachycardia.  Palpable radial and DP pulses bilaterally.  Abdomen:  Soft, nontender, normoactive bowel sounds.    Musculoskeletal:  No midline C-spine, T-spine, L-spine tenderness.  No midline spinal step-offs noted.  FROM in all extremities.  5/5 strength in all extremities.  No gross deformities noted.  Pelvis stable.    Integument:  No ecchymosis, abrasions, lacerations noted.    Neuro:  GCS 14, sensation intact to light touch. Unable to answer all questions appropriately, but alert and oriented to PPT.    EKG    Time: 19:36  Rate: 143 bpm  QRS: 92 ms  QTC: 266/410 ms  Sinus tachycardia, possible left atrial enlargement, left axis deviation, nonspecific ST abnormality  Comparison: Compared to previous from  04/11/2018: Ventricular rate has increased by 48 bpm  I have independently reviewed and interpreted this ECG and agree with the above findings. See scanned document for further details.     RADIOLOGY    Please see official radiology report.  Xr Chest 2 Views    Result Date: 7/8/2018  XR CHEST 2 VIEWS 7/8/2018 8:31 PM INDICATION: Fever. COMPARISON: None. FINDINGS: Negative chest.    Ct Head Without Contrast    Result Date: 7/8/2018  [facility] CT HEAD WO CONTRAST, CT CERVICAL SPINE WO CONTRAST 7/8/2018 8:47 PM INDICATION: Traumatic head and neck injury. TECHNIQUE: HEAD CT: Routine. Dose reduction techniques were used. CERVICAL SPINE CT: Axial images were obtained through the cervical spine and were evaluated in the axial plane with sagittal and coronal reformations. Dose reduction techniques were used. CONTRAST: None. COMPARISON:  Head MRI 04/27/2018 FINDINGS: HEAD CT: No intracranial hemorrhage, extraaxial collection, mass effect or CT evidence of acute infarct.  Normal  parenchymal density for age. The ventricles and sulci are normal for age. Osseous structures are intact. The visualized orbits, paranasal sinuses and mastoid air cells are free of significant disease. CERVICAL SPINE CT: Normal vertebral body heights. No fracture or post-traumatic subluxation. No high-grade spinal canal or neural foraminal stenosis. Grossly normal paraspinal soft tissues. Normal lung apices.     CONCLUSION: HEAD CT: 1.  Normal head CT. 2.  No CT finding of a mass, infarct or hemorrhage. 3.  No change. CERVICAL SPINE CT: 1.  No fracture or post-traumatic subluxation. 2.  No high grade spinal canal or neural foraminal stenosis.     Ct Cervical Spine Without Contrast    Result Date: 7/8/2018  [facility] CT HEAD WO CONTRAST, CT CERVICAL SPINE WO CONTRAST 7/8/2018 8:47 PM INDICATION: Traumatic head and neck injury. TECHNIQUE: HEAD CT: Routine. Dose reduction techniques were used. CERVICAL SPINE CT: Axial images were obtained through the cervical spine and were evaluated in the axial plane with sagittal and coronal reformations. Dose reduction techniques were used. CONTRAST: None. COMPARISON:  Head MRI 04/27/2018 FINDINGS: HEAD CT: No intracranial hemorrhage, extraaxial collection, mass effect or CT evidence of acute infarct.  Normal parenchymal density for age. The ventricles and sulci are normal for age. Osseous structures are intact. The visualized orbits, paranasal sinuses and mastoid air cells are free of significant disease. CERVICAL SPINE CT: Normal vertebral body heights. No fracture or post-traumatic subluxation. No high-grade spinal canal or neural foraminal stenosis. Grossly normal paraspinal soft tissues. Normal lung apices.     CONCLUSION: HEAD CT: 1.  Normal head CT. 2.  No CT finding of a mass, infarct or hemorrhage. 3.  No change. CERVICAL SPINE CT: 1.  No fracture or post-traumatic subluxation. 2.  No high grade spinal canal or neural foraminal stenosis.     FINAL IMPRESSION    1.  Pyelonephritis    2. Seizures (H)    3. Sepsis (H)    4. Hyponatremia           This is an accurate record of my words and actions during this visit as documented by the scribe.       Cecille Hyde MD  07/08/18 0195

## 2021-06-19 NOTE — H&P
Admission History and Physical   Lucy Hernández,  1985, MRN [id number]    PCP: No Primary Care Provider, None   Code status:  No Order       Extended Emergency Contact Information  Primary Emergency Contact: Shabbir Hernández   [facility]  Home Phone: [phone]  Mobile Phone: [phone]  Relation: Father       Assessment and Plan   1. Acute pyelonephritis, consider early sepsis    - Will continue Zosyn and follow up cultures  - check renal for stones, hydroneph, or abscess  - repeat lactic acid, CBC/BMP  - pain meds PRN    2. Seizure, non compliant  - was given LD of Vimpat, and will continue with 50 mgs two times a day per neurology.   - needs neurology follow up -   - seizure precs  - neuro checks    3. Substance abuse - Meth (+), used Marijuana, and ETOH use was last 2018  - monitor, counseled      I certify that inpatient services for greater than two midnights are medically necessary for this patient based on presenting acute medical condition/s listed in the H&P and care plan.     I spent 70 minutes and greater than 50% was spent in direct evaluation of patient and coordination of care including counseling. I discussed the POC with RN and patient/family member/s and agreed to proceed. At the end of the shift, this case will be presented to the AM Hospitalist.      Barriers to discharge - admitting clinical condition  Discharge Disposition -  home or home with home health care       Cesario Mcclain MD, MPH, FACP, [facility]  Internal Medicine - Hospital Medicine             Chief Complaint: Fever      HPI:    Lucy Hernández is a 32 y.o. old female     - Met the patient while in the ED. She was tachycardic at that time. She was awake and interactive. For the last 3 days, nausea and vomiting. She also complained of low back pain. She did develop fever. She was having chills. She has no abdominal pain. No vaginal discharge. She has urinary frequency.   - In addition to above, she has history  of seizure and she is not compliant. Per report, she had one today and was witnessed. Per GF of her father, she was in the kitchen when she had seizure. This last approximately a min, and she was postictal for 10 mins. She had been non compliant with medication. Her last dose was 2 days ago. She is taking lamotrigine. She stopped taking Keppra as it reacts bad with her psoariasis medications.   - In the ED, temp was 101. WBC was 11.3. Lactic acid was minimally elevated. She was loaded with Vimpat per neurology. Did not recommend transfer. She was given IV zosyn  - .ROS --- (+) mild headache, none now. No dizziness. No weakness. No CP or SOB. No palpitations. No bleeding symptoms. No weight loss. Rest of 12 point ROS was reviewed and negative.      Medical History  Active Ambulatory (Non-Hospital) Problems    Diagnosis     Pyelonephritis     Tobacco abuse     Past Medical History:   Diagnosis Date     Psoriasis      Pyelonephritis      Seizures (H)      Tobacco abuse      UTI (urinary tract infection)         Surgical History  OBSTETRICAL CARE 3/6/07      Spontaneous AB         Social History  Reviewed, and she  reports that she has been smoking.  She has been smoking about 0.50 packs per day. She does not have any smokeless tobacco history on file. She reports that she drinks about 0.5 - 1.0 oz of alcohol per week  She reports that she does not use illicit drugs.       Allergies  No Known Allergies Family History  Reviewed, and father has CAD         Prior to Admission Medications     (Not in a hospital admission)       Review of Systems:  A 12 point comprehensive review of systems was negative except as noted. Physical Exam:  Temp:  [101.7  F (38.7  C)-103.1  F (39.5  C)] 102.9  F (39.4  C)  Heart Rate:  [112-147] 112  Resp:  [30] 30  BP: (112-133)/(61-84) 112/61    General Appearance:  Alert, cooperative, no distress  Head:    Normocephalic, without obvious abnormality, atraumatic  EENT:  PERRL,  conjunctiva/corneas clear, EOM's intact.   Neck:   Supple, symmetrical, trachea midline, no adenopathy; no NVE  Back:  Symmetric, no curvature, no CVA tenderness  Chest/Lungs: Clear to auscultation bilaterally, respirations unlabored, No tenderness or deformity. No abdominal breathing or use of accessory muscles.   Heart:    Regular rate and rhythm, S1 and S2 normal, no murmur, rub   or gallop  Abdomen: (+) epigastric pain, (+) bowel sounds active all four quadrants, not peritoneal on palpation. Not distended  Extremities:  Extremities normal, atraumatic, no swelling   Skin:  Skin color, texture, turgor normal, no rashes or lesion  Neurologic:  Awake and alert, Pupils 2-3 mm, EOMS full and equal, can close/open eyes, tongue midline, no facial droop, motor is 5/5, no sensory deficits. No nuchal rigidity. No limitation of ROM of the neck.        Pertinent Labs      Results for orders placed or performed during the hospital encounter of 07/08/18   Comprehensive Metabolic Panel   Result Value Ref Range    Sodium 128 (L) 136 - 145 mmol/L    Potassium 3.1 (L) 3.5 - 5.0 mmol/L    Chloride 94 (L) 98 - 107 mmol/L    CO2 21 (L) 22 - 31 mmol/L    Anion Gap, Calculation 13 5 - 18 mmol/L    Glucose 140 (H) 70 - 125 mg/dL    BUN 9 8 - 22 mg/dL    Creatinine 0.78 0.60 - 1.10 mg/dL    GFR MDRD Af Amer >60 >60 mL/min/1.73m2    GFR MDRD Non Af Amer >60 >60 mL/min/1.73m2    Bilirubin, Total 1.5 (H) 0.0 - 1.0 mg/dL    Calcium 9.4 8.5 - 10.5 mg/dL    Protein, Total 7.8 6.0 - 8.0 g/dL    Albumin 3.5 3.5 - 5.0 g/dL    Alkaline Phosphatase 98 45 - 120 U/L    AST 24 0 - 40 U/L    ALT 15 0 - 45 U/L   APTT   Result Value Ref Range    PTT 32 24 - 37 seconds   INR   Result Value Ref Range    INR 1.24 (H) 0.90 - 1.10   Lactic Acid   Result Value Ref Range    Lactic Acid 2.3 (H) 0.5 - 2.2 mmol/L   Type and Screen   Result Value Ref Range    ABORh A POS     Antibody Screen Negative Negative   Urinalysis-UC if Indicated   Result Value Ref Range     Color, UA Yellow Colorless, Yellow, Straw, Light Yellow    Clarity, UA Hazy (!) Clear    Glucose, UA Negative Negative    Bilirubin, UA Negative Negative    Ketones,  mg/dL (!) Negative    Specific Gravity, UA 1.030 1.001 - 1.030    Blood, UA Small (!) Negative    pH, UA 6.0 4.5 - 8.0    Protein, UA >=500 mg/dL (!) Negative mg/dL    Urobilinogen, UA <2.0 E.U./dL <2.0 E.U./dL, 2.0 E.U./dL    Nitrite, UA Negative Negative    Leukocytes, UA Negative Negative    Bacteria, UA Many (!) None Seen hpf    RBC, UA 0-2 None Seen, 0-2 hpf    WBC, UA 10-25 (!) None Seen, 0-5 hpf    Squam Epithel, UA >100 (!) None Seen, 0-5 lpf    Mucus, UA Many (!) None Seen lpf    Hyaline Casts, UA  (!) 0-5, None Seen lpf   Drug Abuse 1+, Urine   Result Value Ref Range    Amphetamines (!) Screen Negative     Screen Positive (Confirmation available on request)    Benzodiazepines Screen Negative Screen Negative    Opiates (!) Screen Negative     Screen Positive (Confirmation available on request)    Phencyclidine Screen Negative Screen Negative    THC (!) Screen Negative     Screen Positive (Confirmation available on request)    Barbiturates Screen Negative Screen Negative    Cocaine Metabolite Screen Negative Screen Negative    Methadone (!) Screen Negative     Screen Positive (Confirmation available on request)    Oxycodone Screen Negative Screen Negative    Creatinine, Urine 251.3 mg/dL   ETOH Level   Result Value Ref Range    Alcohol, Blood <10 None detected mg/dL   HCG, Qual   Result Value Ref Range    Beta hCG Qualitative Negative Negative   HM1 (CBC with Diff)   Result Value Ref Range    WBC 11.3 (H) 4.0 - 11.0 thou/uL    RBC 3.95 3.80 - 5.40 mill/uL    Hemoglobin 11.7 (L) 12.0 - 16.0 g/dL    Hematocrit 34.5 (L) 35.0 - 47.0 %    MCV 87 80 - 100 fL    MCH 29.6 27.0 - 34.0 pg    MCHC 33.9 32.0 - 36.0 g/dL    RDW 13.2 11.0 - 14.5 %    Platelets 133 (L) 140 - 440 thou/uL    MPV 9.5 8.5 - 12.5 fL    Neutrophils % 94 (H) 50 - 70 %     Lymphocytes % 3 (L) 20 - 40 %    Monocytes % 3 2 - 10 %    Eosinophils % 0 0 - 6 %    Basophils % 0 0 - 2 %    Neutrophils Absolute 10.5 (H) 2.0 - 7.7 thou/uL    Lymphocytes Absolute 0.4 (L) 0.8 - 4.4 thou/uL    Monocytes Absolute 0.3 0.0 - 0.9 thou/uL    Eosinophils Absolute 0.0 0.0 - 0.4 thou/uL    Basophils Absolute 0.0 0.0 - 0.2 thou/uL

## 2021-06-19 NOTE — PROGRESS NOTES
Progress Note    Assessment/Plan  Patient's right arm erythema is markedly decreased.  Still a mass in the forearm however.  Patient to be going to [facility] today or tomorrow.  She is aware she may still require excision of this infected vein area.    Principal Problem:    Sepsis (H)  Active Problems:    Methamphetamine abuse    Partial symptomatic epilepsy with complex partial seizures, not intractable, without status epilepticus (H)    Polysubstance abuse    Opioid abuse with intoxication delirium (H)    Adjustment disorder with mixed anxiety and depressed mood    Substance induced mood disorder (H)      Subjective  Painful as before  Objective    Vital signs in last 24 hours  Temp:  [98.1  F (36.7  C)-99  F (37.2  C)] 98.1  F (36.7  C)  Heart Rate:  [] 88  Resp:  [16-18] 18  BP: ()/(61-83) 98/65  Weight:   121 lb 11.2 oz (55.2 kg)    Intake/Output last 3 shifts  I/O last 3 completed shifts:  In: 2318.4 [P.O.:2152; IV Piggyback:166.4]  Out: -   Intake/Output this shift:  I/O this shift:  In: 180 [P.O.:180]  Out: -       Physical Exam  No change except decrease erythema    Pertinent Labs   Lab Results   Component Value Date    WBC 6.9 07/12/2018    HGB 11.9 (L) 07/12/2018    HCT 36.0 07/12/2018    MCV 89 07/12/2018     07/12/2018             Pertinent Radiology     No results found.        Lucius Dong

## 2021-06-19 NOTE — PROGRESS NOTES
AIDET completed. Writer met briefly with Pt and Pts Shabbir [phone] as pt was sleepy. Pt lives with father in a private residence. She is independent with all ADL's, has no services and no DME used. Per chart, has drug and ETOH use. Informed that CM/SW will follow progression of care.   Isa Coyle RNCM

## 2021-06-19 NOTE — PROGRESS NOTES
Spiritual Care Note    Spiritual Assessment: pt is very sleepy    Care Provided: Follow up visit with pt. She was very sleepy and did not talk much. Writer offered comfort and support.    Plan of Care: Our department will follow as needed or as requested.    Chaplain Shannan Terrell M.Div., [facility]

## 2021-06-19 NOTE — PROGRESS NOTES
"Progress Note    Subjective  New pt to me. Medical records reviewed.      32-year-old woman with history of seizure and polysubstance abuse  was admitted for fever.  Urine looks dirty however she also has IV drug abuse.  She was quite agitated earlier this morning and was started on one-on-one nursing.  She was seen by psych since admission.      Objective    Vitals:    /73 (Patient Position: Lying)  Pulse (!) 115  Temp 98.8  F (37.1  C) (Oral)   Resp 16  Ht 5' 5\" (1.651 m)  Wt 121 lb 11.2 oz (55.2 kg)  LMP 06/08/2018  SpO2 98%  BMI 20.25 kg/m2    Physical Exam  Thin built patient is resting in bed without acute distress.  She seems to be quite lethargic.  Eyes: pupils reactive  HEENT-moist mucous membrane,   Neck-supple, no JVD,   Chest-clear  Heart-RRR  Abd-soft, nontender, BS active .   Neuro-lethargic, fully oriented, cooperative.  No focal finding.  Ext: no edema  Mental-appropriate, cooperative.  No hallucinations or delusions.    Pertinent Labs   Lab Results: personally reviewed.   Recent Results (from the past 48 hour(s))   Potassium   Result Value Ref Range    Potassium 4.1 3.5 - 5.0 mmol/L   Basic Metabolic Panel   Result Value Ref Range    Sodium 137 136 - 145 mmol/L    Potassium 3.7 3.5 - 5.0 mmol/L    Chloride 109 (H) 98 - 107 mmol/L    CO2 21 (L) 22 - 31 mmol/L    Anion Gap, Calculation 7 5 - 18 mmol/L    Glucose 101 70 - 125 mg/dL    Calcium 8.3 (L) 8.5 - 10.5 mg/dL    BUN 6 (L) 8 - 22 mg/dL    Creatinine 0.53 (L) 0.60 - 1.10 mg/dL    GFR MDRD Af Amer >60 >60 mL/min/1.73m2    GFR MDRD Non Af Amer >60 >60 mL/min/1.73m2   Hepatic Profile   Result Value Ref Range    Bilirubin, Total 0.4 0.0 - 1.0 mg/dL    Bilirubin, Direct 0.2 <=0.5 mg/dL    Protein, Total 5.6 (L) 6.0 - 8.0 g/dL    Albumin 2.4 (L) 3.5 - 5.0 g/dL    Alkaline Phosphatase 82 45 - 120 U/L    AST 45 (H) 0 - 40 U/L    ALT 31 0 - 45 U/L   Culture, Blood   Result Value Ref Range    Anaerobic Blood Culture Bottle Specimen not " received No Growth, No organisms seen, bottle returned to instrument, Specimen not received    Aerobic Blood Culture Bottle Positive after 24 hours incubation (!) No Growth, No organisms seen, bottle returned to instrument, Specimen not received   HM1 (CBC with Diff)   Result Value Ref Range    WBC 6.2 4.0 - 11.0 thou/uL    RBC 3.45 (L) 3.80 - 5.40 mill/uL    Hemoglobin 10.1 (L) 12.0 - 16.0 g/dL    Hematocrit 31.9 (L) 35.0 - 47.0 %    MCV 93 80 - 100 fL    MCH 29.3 27.0 - 34.0 pg    MCHC 31.7 (L) 32.0 - 36.0 g/dL    RDW 13.4 11.0 - 14.5 %    Platelets 112 (L) 140 - 440 thou/uL    MPV 10.1 8.5 - 12.5 fL    Neutrophils % 79 (H) 50 - 70 %    Lymphocytes % 14 (L) 20 - 40 %    Monocytes % 6 2 - 10 %    Eosinophils % 1 0 - 6 %    Basophils % 0 0 - 2 %    Neutrophils Absolute 4.9 2.0 - 7.7 thou/uL    Lymphocytes Absolute 0.9 0.8 - 4.4 thou/uL    Monocytes Absolute 0.4 0.0 - 0.9 thou/uL    Eosinophils Absolute 0.1 0.0 - 0.4 thou/uL    Basophils Absolute 0.0 0.0 - 0.2 thou/uL   C-Reactive Protein   Result Value Ref Range    CRP 15.0 (H) 0.0 - 0.8 mg/dL   Culture, Blood Positive Work-up   Result Value Ref Range    Gram Stain Result Gram positive cocci in clusters    Echo Complete   Result Value Ref Range    LV volume diastolic 72.9 46 - 106 cm3    LV volume systolic 37.6 14 - 42 cm3    IVSd 0.885 0.6 - 0.9 cm    LVIDd 4.87 3.8 - 5.2 cm    LVIDs 3.33 2.2 - 3.5 cm    LVOT diam 2.1 cm    LVOT mean gradient 2 mmHg    LVOT peak VTI 18.3 cm    LVOT mean leigh 58.2 cm/s    LVOT peak leigh 89.4 cm/s    LVOT peak gradient 3 mmHg    LV PWd 1.01 0.6 - 0.9 cm    MV E' lat leigh 11.5 cm/s    MV E' med leigh 7.16 cm/s    AV mean leigh 74.8 cm/s    AV mean gradient 3 mmHg    AV VTI 21.5 cm    AV peak leigh 114 cm/s    AO root 2.9 cm    LA size 2.5 cm    MV decel slope 5930 mm/s2    MV decel slope 5930 mm/s2    MV peak A leigh 80 cm/s    MV peak E leigh 83.4 cm/s    TR peak leigh 187 cm/s    BSA 1.6 m2    Hieght 65 in    Weight 2084.8 lbs    /72 mmHg     HR 90 bpm    IVS/PW ratio 0.9     TR peak gradent 14.0 mmHg    LV FS 31.6 28 - 44 %    Echo LVEF calculated 48 55 - 75 %    LA volume 37.8 mL    LV mass 162.1 g    AV area 2.9 cm2    AV DIM IND leigh 0.8     MV E/A Ratio 1.0     LVOT area 3.46 cm2    LVOT SV 63.4 cm3    AV peak gradient 5.2 mmHg    LV systolic volume index 23.5 11 - 31 cm3/m2    LV diastolic volume index 45.6 34 - 74 cm3/m2    LA volume index 23.6 mL/m2    LV mass index 101.3 g/m2    LV SVi 39.6 ml/m2    TAPSE 2.2 cm    MV med E/e' ratio 11.6     MV lat E/e' ratio 7.3     LV CO 5.7 l/min    LV Ci 3.6 l/min/m2    LA area 2 16 cm2    LA area 1 10 cm2    Height 65.0 in    Weight 130 lbs    MV Avg E/e' Ratio 8.9 cm/s    LA length 3.6 cm    AV DIM IND VTI 0.9     Echo LVEF Estimated 50 %   Magnesium   Result Value Ref Range    Magnesium 1.7 (L) 1.8 - 2.6 mg/dL   Basic Metabolic Panel   Result Value Ref Range    Sodium 138 136 - 145 mmol/L    Potassium 3.4 (L) 3.5 - 5.0 mmol/L    Chloride 108 (H) 98 - 107 mmol/L    CO2 23 22 - 31 mmol/L    Anion Gap, Calculation 7 5 - 18 mmol/L    Glucose 101 70 - 125 mg/dL    Calcium 9.0 8.5 - 10.5 mg/dL    BUN 4 (L) 8 - 22 mg/dL    Creatinine 0.54 (L) 0.60 - 1.10 mg/dL    GFR MDRD Af Amer >60 >60 mL/min/1.73m2    GFR MDRD Non Af Amer >60 >60 mL/min/1.73m2   Hepatic Profile   Result Value Ref Range    Bilirubin, Total 0.3 0.0 - 1.0 mg/dL    Bilirubin, Direct 0.1 <=0.5 mg/dL    Protein, Total 6.6 6.0 - 8.0 g/dL    Albumin 2.7 (L) 3.5 - 5.0 g/dL    Alkaline Phosphatase 83 45 - 120 U/L    AST 16 0 - 40 U/L    ALT 26 0 - 45 U/L   HM1 (CBC with Diff)   Result Value Ref Range    WBC 5.2 4.0 - 11.0 thou/uL    RBC 3.73 (L) 3.80 - 5.40 mill/uL    Hemoglobin 11.0 (L) 12.0 - 16.0 g/dL    Hematocrit 33.1 (L) 35.0 - 47.0 %    MCV 89 80 - 100 fL    MCH 29.5 27.0 - 34.0 pg    MCHC 33.2 32.0 - 36.0 g/dL    RDW 13.5 11.0 - 14.5 %    Platelets 157 140 - 440 thou/uL    MPV 10.2 8.5 - 12.5 fL    Neutrophils % 68 50 - 70 %    Lymphocytes % 23  20 - 40 %    Monocytes % 8 2 - 10 %    Eosinophils % 1 0 - 6 %    Basophils % 0 0 - 2 %    Neutrophils Absolute 3.5 2.0 - 7.7 thou/uL    Lymphocytes Absolute 1.2 0.8 - 4.4 thou/uL    Monocytes Absolute 0.4 0.0 - 0.9 thou/uL    Eosinophils Absolute 0.1 0.0 - 0.4 thou/uL    Basophils Absolute 0.0 0.0 - 0.2 thou/uL       Radiology: personally reviewed.   Xr Chest 2 Views    Result Date: 7/8/2018  XR CHEST 2 VIEWS 7/8/2018 8:31 PM INDICATION: Fever. COMPARISON: None. FINDINGS: Negative chest.    Ct Head Without Contrast    Result Date: 7/8/2018  [facility] CT HEAD WO CONTRAST, CT CERVICAL SPINE WO CONTRAST 7/8/2018 8:47 PM INDICATION: Traumatic head and neck injury. TECHNIQUE: HEAD CT: Routine. Dose reduction techniques were used. CERVICAL SPINE CT: Axial images were obtained through the cervical spine and were evaluated in the axial plane with sagittal and coronal reformations. Dose reduction techniques were used. CONTRAST: None. COMPARISON:  Head MRI 04/27/2018 FINDINGS: HEAD CT: No intracranial hemorrhage, extraaxial collection, mass effect or CT evidence of acute infarct.  Normal parenchymal density for age. The ventricles and sulci are normal for age. Osseous structures are intact. The visualized orbits, paranasal sinuses and mastoid air cells are free of significant disease. CERVICAL SPINE CT: Normal vertebral body heights. No fracture or post-traumatic subluxation. No high-grade spinal canal or neural foraminal stenosis. Grossly normal paraspinal soft tissues. Normal lung apices.     CONCLUSION: HEAD CT: 1.  Normal head CT. 2.  No CT finding of a mass, infarct or hemorrhage. 3.  No change. CERVICAL SPINE CT: 1.  No fracture or post-traumatic subluxation. 2.  No high grade spinal canal or neural foraminal stenosis.     Ct Cervical Spine Without Contrast    Result Date: 7/8/2018  [facility] CT HEAD WO CONTRAST, CT CERVICAL SPINE WO CONTRAST 7/8/2018 8:47 PM INDICATION: Traumatic head and neck  injury. TECHNIQUE: HEAD CT: Routine. Dose reduction techniques were used. CERVICAL SPINE CT: Axial images were obtained through the cervical spine and were evaluated in the axial plane with sagittal and coronal reformations. Dose reduction techniques were used. CONTRAST: None. COMPARISON:  Head MRI 04/27/2018 FINDINGS: HEAD CT: No intracranial hemorrhage, extraaxial collection, mass effect or CT evidence of acute infarct.  Normal parenchymal density for age. The ventricles and sulci are normal for age. Osseous structures are intact. The visualized orbits, paranasal sinuses and mastoid air cells are free of significant disease. CERVICAL SPINE CT: Normal vertebral body heights. No fracture or post-traumatic subluxation. No high-grade spinal canal or neural foraminal stenosis. Grossly normal paraspinal soft tissues. Normal lung apices.     CONCLUSION: HEAD CT: 1.  Normal head CT. 2.  No CT finding of a mass, infarct or hemorrhage. 3.  No change. CERVICAL SPINE CT: 1.  No fracture or post-traumatic subluxation. 2.  No high grade spinal canal or neural foraminal stenosis.     Us Kidney Bilateral    Result Date: 7/9/2018  US KIDNEY BILATERAL WITH BLADDER 7/9/2018 9:58 AM INDICATION: Septic/uti - check for stones, hydroneph or renal abscess TECHNIQUE: Routine kidneys and bladder. COMPARISON: None. FINDINGS: Right kidney measures 11.4 x 5.6 x 4.8 cm. No hydronephrosis. No mass. Normal renal echotexture. Left kidney measures 11.8 x 5.2 x 5.9 cm. No hydronephrosis. No mass. Normal renal echotexture. Bladder is normal. Incidentally noted is that the spleen is in the upper range of normal in size.     CONCLUSION: 1.  Normal appearance of both kidneys. 2.  Borderline splenomegaly.      Assessment/Plan    32-year-old female with history of seizure disorder presented with complaints of seizure.  Also complained of having fever, headache, nausea vomiting.     Evaluation with temperature of 103, heart rate initially 147,  respirations 30, blood pressure and oxygenation normal.  Labs with sodium 128, potassium 3.1, normal creatinine.  Lactic acid mildly elevated at 2.3, pro calcitonin elevated at 4.1.  White count minimally elevated at 11.3, hemoglobin 11.7, platelets slightly low at 133.  Transaminases normal.  Urine drug screen positive for amphetamines, methadone, opiates, marijuana.  Urinalysis with bacteria, pyuria, grossly contaminated however.  HCG negative.  Chest x-ray negative.  CT head and cervical spine negative.  Renal ultrasound unremarkable with exception of incidental note of mild splenomegaly.     Blood cultures positive for gram-positive cocci in clusters, 1/2 samples, possible contaminant, patient did admit to some IV drug use or injecting methamphetamine.    Fever. Iv drug abuse   -Blood cultures 1 out of 2 positive for gram-positive cocci which could be contamination however she is also at high risk of endocarditis due to IV drug abuse.  -ID consult appreciated  -Will have HARVEY to rule out endocarditis. , TTE is negative.  -Continue broad coverage with Vanco and Zosyn.  -UA abnormal    Substance abuse/anxiety and depression  Encephalopathy in the context of polysubstance withdrawal.    -Appreciate psych input  -Patient is one-to-one nursing  -Continue COWS protocol  - following    History of seizures  -Seizure precautions     prophylaxis  -Continue subcu heparin PPI      Discharge Barriers: Behavior issues and one-to-one nursing.  35 minutes spent today,   with greater than 50% of time spent in counseling and coordination of care.

## 2021-06-19 NOTE — PROGRESS NOTES
S Daily Progress Note      Subjective:  Pt had HARVEY today, no complaint now.     Review of Systems:   As per below and subjective, all others negative.      Current Medications Reviewed via EHR List    Objective:  Vital signs in last 24 hours:  Temp:  [97.8  F (36.6  C)-98.9  F (37.2  C)] 98.9  F (37.2  C)  Heart Rate:  [] 112  Resp:  [10-27] 16  BP: (104-126)/(62-81) 126/68  SpO2:  [98 %-100 %] 99 %  ETCO2 (mmHg):  [28 mmHg-38 mmHg] 34 mmHg  Weight:   Wt Readings from Last 1 Encounters:   07/11/18 0425 121 lb 11.2 oz (55.2 kg)   07/10/18 0402 130 lb 4.8 oz (59.1 kg)   07/09/18 0321 123 lb (55.8 kg)   07/08/18 2244 123 lb 1.6 oz (55.8 kg)   07/08/18 1940 120 lb (54.4 kg)     Weight change:   Body mass index is 20.25 kg/(m^2).    Intake/Output last 3 shifts:  I/O last 3 completed shifts:  In: 1230 [P.O.:680; IV Piggyback:550]  Out: -   Intake/Output this shift:  I/O this shift:  In: 554 [I.V.:554]  Out: -     Physical Exam:    General: Patient is resting in bed without acute distress.  Appears drowsy.   Skin: No rash, good color  HEENT: No nystagmus, no icterus, moist mucous membrane.   Neck: Supple, no JVD, no carotid bruits  Chest: Clear  Heart: RRR, no murmurs  Abdomin: Soft, nontender, BS active,   Extremities: No edema, good pedal pulse  Neuro: Awake and drowsy, oriented. No focal neurological findings.       Imaging:  Personally Reviewed.  Xr Chest 2 Views    Result Date: 7/8/2018  XR CHEST 2 VIEWS 7/8/2018 8:31 PM INDICATION: Fever. COMPARISON: None. FINDINGS: Negative chest.    Ct Head Without Contrast    Result Date: 7/8/2018  [facility] CT HEAD WO CONTRAST, CT CERVICAL SPINE WO CONTRAST 7/8/2018 8:47 PM INDICATION: Traumatic head and neck injury. TECHNIQUE: HEAD CT: Routine. Dose reduction techniques were used. CERVICAL SPINE CT: Axial images were obtained through the cervical spine and were evaluated in the axial plane with sagittal and coronal reformations. Dose reduction techniques were  used. CONTRAST: None. COMPARISON:  Head MRI 04/27/2018 FINDINGS: HEAD CT: No intracranial hemorrhage, extraaxial collection, mass effect or CT evidence of acute infarct.  Normal parenchymal density for age. The ventricles and sulci are normal for age. Osseous structures are intact. The visualized orbits, paranasal sinuses and mastoid air cells are free of significant disease. CERVICAL SPINE CT: Normal vertebral body heights. No fracture or post-traumatic subluxation. No high-grade spinal canal or neural foraminal stenosis. Grossly normal paraspinal soft tissues. Normal lung apices.     CONCLUSION: HEAD CT: 1.  Normal head CT. 2.  No CT finding of a mass, infarct or hemorrhage. 3.  No change. CERVICAL SPINE CT: 1.  No fracture or post-traumatic subluxation. 2.  No high grade spinal canal or neural foraminal stenosis.     Ct Cervical Spine Without Contrast    Result Date: 7/8/2018  [facility] CT HEAD WO CONTRAST, CT CERVICAL SPINE WO CONTRAST 7/8/2018 8:47 PM INDICATION: Traumatic head and neck injury. TECHNIQUE: HEAD CT: Routine. Dose reduction techniques were used. CERVICAL SPINE CT: Axial images were obtained through the cervical spine and were evaluated in the axial plane with sagittal and coronal reformations. Dose reduction techniques were used. CONTRAST: None. COMPARISON:  Head MRI 04/27/2018 FINDINGS: HEAD CT: No intracranial hemorrhage, extraaxial collection, mass effect or CT evidence of acute infarct.  Normal parenchymal density for age. The ventricles and sulci are normal for age. Osseous structures are intact. The visualized orbits, paranasal sinuses and mastoid air cells are free of significant disease. CERVICAL SPINE CT: Normal vertebral body heights. No fracture or post-traumatic subluxation. No high-grade spinal canal or neural foraminal stenosis. Grossly normal paraspinal soft tissues. Normal lung apices.     CONCLUSION: HEAD CT: 1.  Normal head CT. 2.  No CT finding of a mass, infarct or  hemorrhage. 3.  No change. CERVICAL SPINE CT: 1.  No fracture or post-traumatic subluxation. 2.  No high grade spinal canal or neural foraminal stenosis.     Us Kidney Bilateral    Result Date: 7/9/2018  US KIDNEY BILATERAL WITH BLADDER 7/9/2018 9:58 AM INDICATION: Septic/uti - check for stones, hydroneph or renal abscess TECHNIQUE: Routine kidneys and bladder. COMPARISON: None. FINDINGS: Right kidney measures 11.4 x 5.6 x 4.8 cm. No hydronephrosis. No mass. Normal renal echotexture. Left kidney measures 11.8 x 5.2 x 5.9 cm. No hydronephrosis. No mass. Normal renal echotexture. Bladder is normal. Incidentally noted is that the spleen is in the upper range of normal in size.     CONCLUSION: 1.  Normal appearance of both kidneys. 2.  Borderline splenomegaly.      Lab Results:  Personally Reviewed.   Fingerstick Blood Glucose: No results for input(s): POCGLUFGR in the last 48 hours.    Last Hbg A1C: No results found for: HGBA1C   Lab Results   Component Value Date    INR 1.24 (H) 07/08/2018     Recent Results (from the past 24 hour(s))   Basic Metabolic Panel    Collection Time: 07/12/18  7:57 AM   Result Value Ref Range    Sodium 137 136 - 145 mmol/L    Potassium 4.2 3.5 - 5.0 mmol/L    Chloride 105 98 - 107 mmol/L    CO2 24 22 - 31 mmol/L    Anion Gap, Calculation 8 5 - 18 mmol/L    Glucose 101 70 - 125 mg/dL    Calcium 9.9 8.5 - 10.5 mg/dL    BUN 7 (L) 8 - 22 mg/dL    Creatinine 0.57 (L) 0.60 - 1.10 mg/dL    GFR MDRD Af Amer >60 >60 mL/min/1.73m2    GFR MDRD Non Af Amer >60 >60 mL/min/1.73m2   Lactic Acid    Collection Time: 07/12/18  7:57 AM   Result Value Ref Range    Lactic Acid 1.7 0.5 - 2.2 mmol/L   Vancomycin (Vancocin )    Collection Time: 07/12/18  7:57 AM   Result Value Ref Range    Vancomycin 5.6 <=25.0 ug/mL   HM1 (CBC with Diff)    Collection Time: 07/12/18  7:57 AM   Result Value Ref Range    WBC 6.9 4.0 - 11.0 thou/uL    RBC 4.06 3.80 - 5.40 mill/uL    Hemoglobin 11.9 (L) 12.0 - 16.0 g/dL    Hematocrit  36.0 35.0 - 47.0 %    MCV 89 80 - 100 fL    MCH 29.3 27.0 - 34.0 pg    MCHC 33.1 32.0 - 36.0 g/dL    RDW 13.5 11.0 - 14.5 %    Platelets 204 140 - 440 thou/uL    MPV 9.6 8.5 - 12.5 fL   Manual Differential    Collection Time: 07/12/18  7:57 AM   Result Value Ref Range    Total Neutrophils % 62 50 - 70 %    Lymphocytes % 27 20 - 40 %    Monocytes % 9 2 - 10 %    Eosinophils %  2 0 - 6 %    Basophils % 0 0 - 2 %    Total Neutrophils Absolute 4.3 2.0 - 7.7 thou/ul    Lymphocytes Absolute 1.9 0.8 - 4.4 thou/uL    Monocytes Absolute 0.6 0.0 - 0.9 thou/uL    Eosinophils Absolute 0.1 0.0 - 0.4 thou/uL    Basophils Absolute 0.0 0.0 - 0.2 thou/uL    Reactive Lymphocytes 1+ (!) Negative    Platelet Estimate Normal Normal   Echo Transesophageal    Collection Time: 07/12/18 11:44 AM   Result Value Ref Range    BSA 1.6 m2    Hieght 65 in    Weight 1947.2 lbs    /62 mmHg     bpm    Height 65.0 in    Weight 122 lbs    Echo LVEF Estimated 60 %       Assessment/Plan:    32-year-old female with history of seizure disorder presented with complaints of seizure.  Also complained of having fever, headache, nausea vomiting.      Evaluation with temperature of 103, heart rate initially 147, respirations 30, blood pressure and oxygenation normal.  Labs with sodium 128, potassium 3.1, normal creatinine.  Lactic acid mildly elevated at 2.3, pro calcitonin elevated at 4.1.  White count minimally elevated at 11.3, hemoglobin 11.7, platelets slightly low at 133.  Transaminases normal.  Urine drug screen positive for amphetamines, methadone, opiates, marijuana.  Urinalysis with bacteria, pyuria, grossly contaminated however.  HCG negative.  Chest x-ray negative.  CT head and cervical spine negative.  Renal ultrasound unremarkable with exception of incidental note of mild splenomegaly.      Blood cultures positive pan sensitive stagh aureas,  patient did admit to some IV drug use or injecting methamphetamine.     Fever. Iv drug abuse    - Blood cultures positive pan sensitive juan hudson,  patient did admit to some IV drug use or injecting methamphetamine. at high risk of endocarditis due to IV drug abuse.  -ID consult appreciated  - TTE is negative. HARVEY done today unremarkable.   -likely ID will narrow antibiotic per culture result.      Substance abuse/anxiety and depression  Encephalopathy in the context of polysubstance withdrawal.    -Appreciate psych input  -Patient is one-to-one nursing  -Continue COWS protocol  - following. Awaiting for chapter 25 eval today.      History of seizures  -Seizure precautions      prophylaxis  -Continue subcu heparin PPI       Prophylaxis:  -PPI.  -SCD and Heparin SQ.      Disposition:  Pending.       Time > 35 minutes with greater than 50% of time spent in counseling and coordination of care.    Latrice Polanco MD  Date: 7/12/2018  Time: 3:28 PM

## 2021-06-19 NOTE — PROGRESS NOTES
Spiritual Care Note    Spiritual Assessment: Pt looked anxious    Care Provided: Brief check in with pt, no needs at this time.    Plan of Care: Our department will follow as needed or as requested.    antoinette Terrell M.Div., [facility]

## 2021-06-19 NOTE — PROGRESS NOTES
"Progress Note    Subjective  Blood culture negative since 7/12 am. Pt has difficulty with iv access. She is anxious to go to [facility].       Objective    Vitals:    /75 (Patient Position: Lying)  Pulse 99  Temp 97.9  F (36.6  C) (Oral)   Resp 16  Ht 5' 5\" (1.651 m)  Wt 121 lb 11.2 oz (55.2 kg)  LMP 06/08/2018  SpO2 99%  BMI 20.25 kg/m2    Physical Exam  Patient is resting in bed without acute distress.   Eyes: pupils reactive  HEENT-moist mucous membrane,   Neck-supple, no JVD,   Chest-clear  Heart-RRR  Abd-soft, nontender, BS active .   Neuro-alert oriented, no focal finding.  Ext: no edema    Pertinent Labs   Lab Results: personally reviewed.   Recent Results (from the past 48 hour(s))   Magnesium   Result Value Ref Range    Magnesium 2.0 1.8 - 2.6 mg/dL   Basic Metabolic Panel   Result Value Ref Range    Sodium 139 136 - 145 mmol/L    Potassium 4.0 3.5 - 5.0 mmol/L    Chloride 106 98 - 107 mmol/L    CO2 24 22 - 31 mmol/L    Anion Gap, Calculation 9 5 - 18 mmol/L    Glucose 105 70 - 125 mg/dL    Calcium 9.5 8.5 - 10.5 mg/dL    BUN 9 8 - 22 mg/dL    Creatinine 0.60 0.60 - 1.10 mg/dL    GFR MDRD Af Amer >60 >60 mL/min/1.73m2    GFR MDRD Non Af Amer >60 >60 mL/min/1.73m2   Basic Metabolic Panel   Result Value Ref Range    Sodium 141 136 - 145 mmol/L    Potassium 3.9 3.5 - 5.0 mmol/L    Chloride 105 98 - 107 mmol/L    CO2 26 22 - 31 mmol/L    Anion Gap, Calculation 10 5 - 18 mmol/L    Glucose 113 70 - 125 mg/dL    Calcium 9.4 8.5 - 10.5 mg/dL    BUN 9 8 - 22 mg/dL    Creatinine 0.66 0.60 - 1.10 mg/dL    GFR MDRD Af Amer >60 >60 mL/min/1.73m2    GFR MDRD Non Af Amer >60 >60 mL/min/1.73m2       Radiology: personally reviewed.   Xr Chest 2 Views    Result Date: 7/8/2018  XR CHEST 2 VIEWS 7/8/2018 8:31 PM INDICATION: Fever. COMPARISON: None. FINDINGS: Negative chest.    Ct Head Without Contrast    Result Date: 7/8/2018  [facility] CT HEAD WO CONTRAST, CT CERVICAL SPINE WO CONTRAST 7/8/2018 8:47 PM " INDICATION: Traumatic head and neck injury. TECHNIQUE: HEAD CT: Routine. Dose reduction techniques were used. CERVICAL SPINE CT: Axial images were obtained through the cervical spine and were evaluated in the axial plane with sagittal and coronal reformations. Dose reduction techniques were used. CONTRAST: None. COMPARISON:  Head MRI 04/27/2018 FINDINGS: HEAD CT: No intracranial hemorrhage, extraaxial collection, mass effect or CT evidence of acute infarct.  Normal parenchymal density for age. The ventricles and sulci are normal for age. Osseous structures are intact. The visualized orbits, paranasal sinuses and mastoid air cells are free of significant disease. CERVICAL SPINE CT: Normal vertebral body heights. No fracture or post-traumatic subluxation. No high-grade spinal canal or neural foraminal stenosis. Grossly normal paraspinal soft tissues. Normal lung apices.     CONCLUSION: HEAD CT: 1.  Normal head CT. 2.  No CT finding of a mass, infarct or hemorrhage. 3.  No change. CERVICAL SPINE CT: 1.  No fracture or post-traumatic subluxation. 2.  No high grade spinal canal or neural foraminal stenosis.     Ct Cervical Spine Without Contrast    Result Date: 7/8/2018  [facility] CT HEAD WO CONTRAST, CT CERVICAL SPINE WO CONTRAST 7/8/2018 8:47 PM INDICATION: Traumatic head and neck injury. TECHNIQUE: HEAD CT: Routine. Dose reduction techniques were used. CERVICAL SPINE CT: Axial images were obtained through the cervical spine and were evaluated in the axial plane with sagittal and coronal reformations. Dose reduction techniques were used. CONTRAST: None. COMPARISON:  Head MRI 04/27/2018 FINDINGS: HEAD CT: No intracranial hemorrhage, extraaxial collection, mass effect or CT evidence of acute infarct.  Normal parenchymal density for age. The ventricles and sulci are normal for age. Osseous structures are intact. The visualized orbits, paranasal sinuses and mastoid air cells are free of significant disease.  CERVICAL SPINE CT: Normal vertebral body heights. No fracture or post-traumatic subluxation. No high-grade spinal canal or neural foraminal stenosis. Grossly normal paraspinal soft tissues. Normal lung apices.     CONCLUSION: HEAD CT: 1.  Normal head CT. 2.  No CT finding of a mass, infarct or hemorrhage. 3.  No change. CERVICAL SPINE CT: 1.  No fracture or post-traumatic subluxation. 2.  No high grade spinal canal or neural foraminal stenosis.     Us Kidney Bilateral    Result Date: 7/9/2018  US KIDNEY BILATERAL WITH BLADDER 7/9/2018 9:58 AM INDICATION: Septic/uti - check for stones, hydroneph or renal abscess TECHNIQUE: Routine kidneys and bladder. COMPARISON: None. FINDINGS: Right kidney measures 11.4 x 5.6 x 4.8 cm. No hydronephrosis. No mass. Normal renal echotexture. Left kidney measures 11.8 x 5.2 x 5.9 cm. No hydronephrosis. No mass. Normal renal echotexture. Bladder is normal. Incidentally noted is that the spleen is in the upper range of normal in size.     CONCLUSION: 1.  Normal appearance of both kidneys. 2.  Borderline splenomegaly.      Assessment/Plan    31 yo female with IVDA is admitted for bacteremia and sepsis.   Sepsis, MSSA bacteremia   IVDA  Difficulty with iv accesss.   -on nafcillin 2g q4h   -PICC placement, she is >48 hr culture negative. She will go to [facility] hopefully next week for psych  -ID following     Depression  Hx of sz  -cont op meds.     Discharge Barriers: awaiting psych transfer.   35 minutes spent today,   with greater than 50% of time spent in counseling and coordination of care.

## 2021-06-19 NOTE — PROGRESS NOTES
Infectious Disease Chart Check    Discharge plans noted.  [place]         ASSESSMENT:  1. MSSA bacteremia.  Blood cultures on positive from 7/9-7/11.  Repeat cultures pending.  Initial Vanco trough showing sub-therapeutic levels.  HARVEY negative for vegetations on 7/12. Treat SA bacteremia with IV antibiotics, switching or oral antibiotics increases risk of relapse.   2. IV drug abuse.  Active drug use complicates needed IV antibiotic therapy  3. History of seizures     PLAN:  1. Nafcillin 2 g IV every 4 hours, can be run as continuous infusion 12gm/24hours--at least until 7/26.  Could consider changing to po abx at that point--cephalexin 1000 mg po four times a day for two weeks..  Optimal would be to keep on IV treatment until 8/9--but may not be practicable.  2. I would not recommend PICC line or home IV therapy due to active drug use, but due to difficult IV access she can get single lumen PICC anytime to be used in controlled setting (hospital), best to keep nafcillin as continuous infusion so it is running at all times.     Pawel Morales MD

## 2021-06-19 NOTE — PROGRESS NOTES
"Pharmacy Consult: Vancomycin Dosing    Pharmacist consulted to dose vancomycin for Lucy Hernández, a 32 y.o. female.    Ordering provider: Dr. Arita     Indication for vancomycin therapy: pyelonephritits     Goal Trough Range:  10-15 mcg/mL based on indication    Other current antimicrobials             vancomycin 1,000 mg in sodium chloride 0.9% 250 mL (VANCOCIN)  Every 12 hours          piperacillin-tazobactam 3.375 g in NaCl 0.9 % 50 mL (MINI-BAG Plus) (ZOSYN)  Every 8 hours             Subjective/Objective:    Patient was admitted for Sepsis (H) on 7/8/2018    Height: 5' 5\" (1.651 m)    Actual Body Weight (ABW): 59.1 kg (130 lb 4.8 oz)    Ideal body weight: 57 kg (125 lb 10.6 oz)  Adjusted ideal body weight: 57.8 kg (127 lb 8.3 oz)    BMI: Body mass index is 21.68 kg/(m^2).    No Known Allergies    Patient Active Problem List   Diagnosis     Pyelonephritis     Tobacco abuse     Sepsis (H)     Methamphetamine abuse    Past Medical History:   Diagnosis Date     Methamphetamine abuse     injectable method     Psoriasis      Pyelonephritis      Seizures (H)      Tobacco abuse      UTI (urinary tract infection)         Temp Readings from Current Encounter:     07/10/18 0000 07/10/18 0402 07/10/18 0715   Temp: 98.1  F (36.7  C) 98  F (36.7  C) 97.5  F (36.4  C)     Net Intake/Output (last 24 hours):  I/O last 3 completed shifts:  In: 2864 [P.O.:1320; I.V.:1494; IV Piggyback:50]  Out: 1675 [Urine:1675]    Recent Labs      07/08/18   1957  07/08/18   2200  07/09/18   0341  07/10/18   0330   WBC  11.3*   --   8.2  6.2   NEUTROABS  10.5*   --    --   4.9   LACTICACID  2.3*  0.6  0.7   --    PROCAL  4.10*   --    --    --    BUN  9   --   8  6*   CREATININE  0.78   --   0.62  0.53*     Estimated Creatinine Clearance: 144.8 mL/min (by C-G formula based on Cr of 0.53).    No results for input(s): CULTURE in the last 72 hours.    No results found for any visits on 07/08/18.    No results for input(s): VANCOMYCIN in the last " 168 hours.    Vancomycin administrations: (last 120 hours)     None          Assessment/Plan:    Pharmacist consulted to dose vancomycin for pyelonephritis , goal trough range 10-15 mcg/mL.  1. Initiate vancomycin 1000mg IV every 12 hours (16.5 mg/kg actual body weight).  2. No vancomycin level available for assessment.  3. Pharmacist will plan to check a vancomycin trough level prior to 4th or 5th dose as needed.  4. Pharmacist will continue to follow.    Thank you for the consult.  Bernabe Tipton, PharmD 7/10/2018 7:19 AM

## 2021-06-19 NOTE — PROGRESS NOTES
[facility] Daily Progress Note   Assessment/Plan:    32 year old female with history of seizure, tobacco abuse IV drug abuse presented to ER with seizure and she was diagnosed with sepsis bacteremia    MSSA bacteremia   Negative HARVEY, positive BC from 7/9-7/11, BC on 7/12 negative so far   On nafcillin IV  PICC line place, history of IVDU    She will be transferred to inpatient psych facility, awaiting placement     Superficial phlebitis with clot in the right forearm  Had infiltrated IV ampicillin in the area 3 days ago  Surgery consulted  On aspirin 81 mg daily  Surgery is following, appreciate recommendations     Seizure disorder   On lamotrigine, resume   Started on Vimpat 50 mg two times a day       Barriers to discharge. Awaiting transfer    Anticipated discharge/disposition. Soon    LOS: 9 days     Subjective:  Denies any complaints, awaiting placement   Afebrile      aspirin  81 mg Oral DAILY     gabapentin  300 mg Oral TID     hydrOXYzine pamoate  25 mg Oral TID     lacosamide  50 mg Oral BID     lamoTRIgine  25 mg Oral DAILY     sodium chloride  10-30 mL Intravenous Q8H FIXED TIMES       Objective:  Vital signs in last 24 hours:  Temp:  [97.5  F (36.4  C)-98.4  F (36.9  C)] 98.3  F (36.8  C)  Heart Rate:  [] 97  Resp:  [16-18] 16  BP: (108-121)/(67-96) 108/69  Weight:   121 lb 11.2 oz (55.2 kg)    Intake/Output last 3 shifts:  I/O last 3 completed shifts:  In: 780 [P.O.:780]  Out: -   Intake/Output this shift:  I/O this shift:  In: 240 [P.O.:240]  Out: -     Review of Systems:   As per subjective, all others negative.    Physical Exam:    General Appearance:  Alert, cooperative, no distress   Head:  Normocephalic, atraumatic   Eyes:  PERRL    Neck: No JVD, no LAP   Lungs:   Clear to auscultation bilaterally, respirations unlabored   Chest Wall:  No tenderness or deformity   Heart:  Regular rate and rhythm, S1, S2 normal,no murmur   Abdomen:   Soft, non tender, non distended, bowel sounds present, no guarding  or rigidity   Extremities: Erythema is receding around the line in forearm, there is linear induration noted along the right medial forearm      Skin: Skin color, texture, turgor normal   Neurologic: Alert and oriented X 3, Moves all 4 extremities     Imaging:  Us Venous Arm Right  Result Date: 7/15/2018  CONCLUSION: 1.  The right arm veins are negative for DVT. 2.  Superficial peripheral clot in the right forearm.    Lab Results:  Personally Reviewed.   Lab Results   Component Value Date    WBC 6.9 07/12/2018    HGB 11.9 (L) 07/12/2018    HCT 36.0 07/12/2018    MCV 89 07/12/2018     07/12/2018     Lab Results   Component Value Date    CREATININE 0.67 07/17/2018    BUN 9 07/17/2018     07/17/2018    K 3.8 07/17/2018     07/17/2018    CO2 29 07/17/2018       Total time spent was greater than 35 minutes, greater that 50% of this time was spent in direct evaluation of patient.    Laureen Jose M.D  [facility]  Internal Medicine

## 2021-06-19 NOTE — PROGRESS NOTES
Informed of swelling right forearm - same site as PICC    US showed -- superficial peripheral clot in the right forearm    Plans - NSAID, warm compress, arm elevation, not sure of utility of treatment SQ dose of heparin in this situation. Curb-sided ICU. Informed of clot length >4 cms. Repeat US in 2-3 days per her recommendation. Ambulate patient for DVT proph

## 2021-06-19 NOTE — CONSULTS
Cm visited with Pt and father. Pt stated that she would do a Rule 25 assessment. Called and made an assessment with [facility]'s Noris. Noris stated that she would be out for the assessment at 11AM on 7/10/2018. Pt stated that she would be open to out patient progarm. Family would like impatent program.      CARL Simpson

## 2021-06-19 NOTE — PROGRESS NOTES
Spiritual Care Note    Spiritual Assessment: Pt was on the phone crying when writer entered    Care Provided: Attempted a follow up visit with pt and offered to take her outside. Pt declined going outside at this time. Writer offered comfort and support.    Plan of Care: Our department will follow as needed or as requested.    Chaplain Shannan Terrell M.Div., [facility]

## 2021-06-19 NOTE — PROGRESS NOTES
8:36 AM    CM got a Rule 25 assessment for the Pt for 7/12/2018 at 12pm. Noris from [facility] stated that she would be able to finish the assessment that she started for the Pt.     CARL Simpson

## 2021-06-19 NOTE — PROGRESS NOTES
"Progress Note    Subjective  C/o rt forearm pain and swelling since last night , doppler shows a superfcial thrombosis.       Objective    Vitals:    /60 (Patient Position: Lying)  Pulse 93  Temp 98.4  F (36.9  C) (Oral)   Resp 18  Ht 5' 5\" (1.651 m)  Wt 121 lb 11.2 oz (55.2 kg)  LMP 06/08/2018  SpO2 93%  BMI 20.25 kg/m2    Physical Exam  Patient is resting in bed without acute distress.   Eyes: pupils reactive  HEENT-moist mucous membrane,   Neck-supple, no JVD,   Chest-clear  Heart-RRR  Abd-soft, nontender, BS active .   Neuro-alert oriented, no focal finding.  Ext: no edema.  Right forearm is with mild erythema and a indurated area that is very tender.  Erythema seems to be receding from the marked border    Pertinent Labs   Lab Results: personally reviewed.   Recent Results (from the past 48 hour(s))   Basic Metabolic Panel   Result Value Ref Range    Sodium 141 136 - 145 mmol/L    Potassium 3.9 3.5 - 5.0 mmol/L    Chloride 105 98 - 107 mmol/L    CO2 26 22 - 31 mmol/L    Anion Gap, Calculation 10 5 - 18 mmol/L    Glucose 113 70 - 125 mg/dL    Calcium 9.4 8.5 - 10.5 mg/dL    BUN 9 8 - 22 mg/dL    Creatinine 0.66 0.60 - 1.10 mg/dL    GFR MDRD Af Amer >60 >60 mL/min/1.73m2    GFR MDRD Non Af Amer >60 >60 mL/min/1.73m2   Basic Metabolic Panel   Result Value Ref Range    Sodium 140 136 - 145 mmol/L    Potassium 3.9 3.5 - 5.0 mmol/L    Chloride 104 98 - 107 mmol/L    CO2 26 22 - 31 mmol/L    Anion Gap, Calculation 10 5 - 18 mmol/L    Glucose 97 70 - 125 mg/dL    Calcium 9.5 8.5 - 10.5 mg/dL    BUN 9 8 - 22 mg/dL    Creatinine 0.63 0.60 - 1.10 mg/dL    GFR MDRD Af Amer >60 >60 mL/min/1.73m2    GFR MDRD Non Af Amer >60 >60 mL/min/1.73m2       Radiology: personally reviewed.   Xr Chest 2 Views    Result Date: 7/8/2018  XR CHEST 2 VIEWS 7/8/2018 8:31 PM INDICATION: Fever. COMPARISON: None. FINDINGS: Negative chest.    Ct Head Without Contrast    Result Date: 7/8/2018  [facility] CT HEAD WO CONTRAST, " CT CERVICAL SPINE WO CONTRAST 7/8/2018 8:47 PM INDICATION: Traumatic head and neck injury. TECHNIQUE: HEAD CT: Routine. Dose reduction techniques were used. CERVICAL SPINE CT: Axial images were obtained through the cervical spine and were evaluated in the axial plane with sagittal and coronal reformations. Dose reduction techniques were used. CONTRAST: None. COMPARISON:  Head MRI 04/27/2018 FINDINGS: HEAD CT: No intracranial hemorrhage, extraaxial collection, mass effect or CT evidence of acute infarct.  Normal parenchymal density for age. The ventricles and sulci are normal for age. Osseous structures are intact. The visualized orbits, paranasal sinuses and mastoid air cells are free of significant disease. CERVICAL SPINE CT: Normal vertebral body heights. No fracture or post-traumatic subluxation. No high-grade spinal canal or neural foraminal stenosis. Grossly normal paraspinal soft tissues. Normal lung apices.     CONCLUSION: HEAD CT: 1.  Normal head CT. 2.  No CT finding of a mass, infarct or hemorrhage. 3.  No change. CERVICAL SPINE CT: 1.  No fracture or post-traumatic subluxation. 2.  No high grade spinal canal or neural foraminal stenosis.     Ct Cervical Spine Without Contrast    Result Date: 7/8/2018  [facility] CT HEAD WO CONTRAST, CT CERVICAL SPINE WO CONTRAST 7/8/2018 8:47 PM INDICATION: Traumatic head and neck injury. TECHNIQUE: HEAD CT: Routine. Dose reduction techniques were used. CERVICAL SPINE CT: Axial images were obtained through the cervical spine and were evaluated in the axial plane with sagittal and coronal reformations. Dose reduction techniques were used. CONTRAST: None. COMPARISON:  Head MRI 04/27/2018 FINDINGS: HEAD CT: No intracranial hemorrhage, extraaxial collection, mass effect or CT evidence of acute infarct.  Normal parenchymal density for age. The ventricles and sulci are normal for age. Osseous structures are intact. The visualized orbits, paranasal sinuses and mastoid  air cells are free of significant disease. CERVICAL SPINE CT: Normal vertebral body heights. No fracture or post-traumatic subluxation. No high-grade spinal canal or neural foraminal stenosis. Grossly normal paraspinal soft tissues. Normal lung apices.     CONCLUSION: HEAD CT: 1.  Normal head CT. 2.  No CT finding of a mass, infarct or hemorrhage. 3.  No change. CERVICAL SPINE CT: 1.  No fracture or post-traumatic subluxation. 2.  No high grade spinal canal or neural foraminal stenosis.     Us Kidney Bilateral    Result Date: 7/9/2018  US KIDNEY BILATERAL WITH BLADDER 7/9/2018 9:58 AM INDICATION: Septic/uti - check for stones, hydroneph or renal abscess TECHNIQUE: Routine kidneys and bladder. COMPARISON: None. FINDINGS: Right kidney measures 11.4 x 5.6 x 4.8 cm. No hydronephrosis. No mass. Normal renal echotexture. Left kidney measures 11.8 x 5.2 x 5.9 cm. No hydronephrosis. No mass. Normal renal echotexture. Bladder is normal. Incidentally noted is that the spleen is in the upper range of normal in size.     CONCLUSION: 1.  Normal appearance of both kidneys. 2.  Borderline splenomegaly.    Us Venous Arm Right    Result Date: 7/15/2018  US VENOUS ARM RIGHT 7/15/2018 12:06 AM INDICATION: Arm pain and edema TECHNIQUE: Duplex exam performed utilizing 2D gray-scale imaging, Doppler interrogation with color-flow and spectral waveform analysis. Exam performed without and with compression when possible. COMPARISON: None. FINDINGS: Ultrasound includes evaluation of the internal jugular veins, innominate veins, subclavian veins, axillary veins, and brachial veins. The superficial cephalic and basilic veins were also evaluated where seen. The opposite subclavian vein medially was also included in the evaluation. RIGHT ARM VEINS: Negative for DVT. Superficial peripheral clot in right forearm.     CONCLUSION: 1.  The right arm veins are negative for DVT. 2.  Superficial peripheral clot in the right  forearm.      Assessment/Plan    31 yo female with IVDA is admitted for bacteremia and sepsis.     Sepsis, MSSA bacteremia   IVDA  Difficulty with iv accesss.   -on nafcillin 2g q4h for 2 weeks per ID  -PICC placed on July 14, she is  culture negative since July 12.. She will go to [facility] hopefully next week for patient psych     Superficial phlebitis with clot in the right arm  -She had infiltrated IV nafcillin in the area 3 days ago.  Nafcillin could cause severe local reaction though less likely after 3 days.  - will consult  surgery, Dr. Elizondo  -NSAIDs and local care.  -Aspirin 81 mg daily    Depression  Hx of sz  -cont op meds.     Disposition: Discharge to patient psych when she is accepted.  She should not go home with a PICC line due to IV drug abuse.  35 minutes spent today,   with greater than 50% of time spent in counseling and coordination of care.

## 2021-06-19 NOTE — PROGRESS NOTES
Assessment and Referral - [facility]    This writer was informed that a Rule 25 had been sent over referring the patient to 2700.  This writer reviewed the Rule 25 and she appears appropriate for the unit based on the severity of the dimensions.  However, the patient does not appear to be medically cleared and it is noted that she is still on IV antibiotics.  This writer spoke with the patients nurse and she did confirm this, she was then informed that the unit is not able to take patient's with an IV.  SW on the unit will let this writer know when the IV is discontinued.      VELMA Traylor, [facility]  7/17/2018  9:47 AM

## 2021-06-19 NOTE — PROGRESS NOTES
"Pharmacy Consult: Vancomycin Dosing    Pharmacist consulted to dose vancomycin for Lucy Hernández, a 32 y.o. female.    Ordering provider: Dr. Arita     Indication for vancomycin therapy: pyelonephritits -now concern for endocarditis, waiting on HARVEY    Goal Trough Range:  15-20 mcg/mL based on indication    Other current antimicrobials             vancomycin 1.25 g in sodium chloride 0.9% 500 mL (VANCOCIN)  Every 8 hours             Subjective/Objective:    Patient was admitted for Sepsis (H) on 7/8/2018    Height: 5' 5\" (1.651 m)    Actual Body Weight (ABW): 55.2 kg (121 lb 11.2 oz)    Ideal body weight: 57 kg (125 lb 10.6 oz)    BMI: Body mass index is 20.25 kg/(m^2).    No Known Allergies    Patient Active Problem List   Diagnosis     Pyelonephritis, acute     Tobacco abuse     Sepsis (H)     Methamphetamine abuse     Partial symptomatic epilepsy with complex partial seizures, not intractable, without status epilepticus (H)     Polysubstance abuse     Opioid abuse with intoxication delirium (H)     Adjustment disorder with mixed anxiety and depressed mood     Substance induced mood disorder (H)    Past Medical History:   Diagnosis Date     Methamphetamine abuse     injectable method     Psoriasis      Pyelonephritis      Seizures (H)      Tobacco abuse      UTI (urinary tract infection)         Temp Readings from Current Encounter:     07/11/18 2141 07/12/18 0038 07/12/18 0741   Temp: 98.3  F (36.8  C) 97.9  F (36.6  C) 98.3  F (36.8  C)     Net Intake/Output (last 24 hours):  I/O last 3 completed shifts:  In: 1703.8 [P.O.:1200; I.V.:253.8; IV Piggyback:250]  Out: 1350 [Urine:1350]    Recent Labs      07/10/18   0330  07/11/18   0728  07/12/18   0757   WBC  6.2  5.2  6.9   NEUTROABS  4.9  3.5   --    LACTICACID   --    --   1.7   CRP  15.0*   --    --    BUN  6*  4*  7*   CREATININE  0.53*  0.54*  0.57*     Estimated Creatinine Clearance: 125.8 mL/min (by C-G formula based on Cr of 0.57).    No results for " input(s): CULTURE in the last 72 hours.    Results for orders placed or performed during the hospital encounter of 07/08/18   Culture, Blood Positive Work-up    Collection Time: 07/09/18  3:41 AM   Result Value Status    Culture Staphylococcus aureus (!) Preliminary   Culture, Urine    Collection Time: 07/08/18  7:47 PM   Result Value Status    Culture Mixture of urogenital organisms Final       Recent labs: (last 7 days)      07/12/18   0757   VANCOMYCIN  5.6       Vancomycin administrations: (last 120 hours)     Date/Time Action Medication Dose Rate    07/11/18 2115 New Bag    vancomycin 1,000 mg in sodium chloride 0.9% 250 mL (VANCOCIN) 1,000 mg 135 mL/hr    07/11/18 0923 New Bag    vancomycin 1,000 mg in sodium chloride 0.9% 250 mL (VANCOCIN) 1,000 mg 135 mL/hr    07/10/18 2054 New Bag    vancomycin 1,000 mg in sodium chloride 0.9% 250 mL (VANCOCIN) 1,000 mg 135 mL/hr    07/10/18 1030 New Bag    vancomycin 1,000 mg in sodium chloride 0.9% 250 mL (VANCOCIN) 1,000 mg 135 mL/hr          Assessment/Plan:Due to concern for endocarditis in IV drug user and low trough, will increase dose and frequency. Based on [employer] calculator, a dose of 1250mg q8h would expect a trough of ~17.1 (leaving the dose at 1000mg q8h would only expect trough of ~13)    Pharmacist consulted to dose vancomycin for pyelonephritis /concern for endocarditis, goal trough range 15-20 mcg/mL.  1. Change to vancomycin 1250mg IV every 8 hours (22.6 mg/kg actual body weight).  2. Vanco trough of 5.6 this AM  3. Pharmacist will plan to check a vancomycin trough level prior to 4th or 5th dose as needed.  4. Pharmacist will continue to follow.    Thank you for the consult.  Caridad Brewer RPh 7/12/2018 9:02 AM

## 2021-06-19 NOTE — PROGRESS NOTES
Infectious Diseases Progress Note  [facility]    Date of visit: 2018    ASSESSMENT:  1. MSSA bacteremia.  Blood cultures positive from -.  Repeat cultures pending.  Initial Vanco trough showing sub-therapeutic levels.  HARVEY negative for vegetations on . Nafcillin started on .  2. IV drug abuse.  Active drug use complicates needed IV antibiotic therapy  3. History of seizures    PLAN:  1. Continue nafcillin 2 g IV every 4 hours. Plan for 2 wks from the first negative blood culture  2. Daily blood cultures to ensure clearance, ordered through the weekend  3. I would not recommend PICC line or home IV therapy due to active drug use    Yousif Simons MD  [facility]  Direct messaging: [employer]  On-Call ID provider: [phone], option: 9    ==================================================================    SUBJECTIVE / INTERVAL HISTORY:  No events overnight. Started nafcillin. IV infiltrated on left forearm. Feeling axious today    Vancomycin trough returned this morning low at 5.6.  Dosing readjusted this morning.    ROS:   Afebrile, no rashes    Current Antimicrobials:  Nafcillin -       Previous:  Vancomycin 7/10-  Zosyn -7/10      OBJECTIVE:  Vitals:    18 0754   BP: 105/69   Pulse: 88   Resp: 16   Temp: 98.5  F (36.9  C)   SpO2: 97%       Temp (24hrs), Av.4  F (36.9  C), Min:97.8  F (36.6  C), Max:98.9  F (37.2  C)        Intake/Output Summary (Last 24 hours) at 18 1036  Last data filed at 18 0538   Gross per 24 hour   Intake             1874 ml   Output                0 ml   Net             1874 ml        GENERAL:  well-developed, well-nourished, lying in bed in no acute distress.   HENT:  Head is normocephalic, atraumatic.   EYES:  Eyes have anicteric sclerae.    LUNGS:  Nl respiratory pattern  CARDIOVASCULAR:  Regular rate and rhythm with no murmurs, gallops or rubs.  ABDOMEN:  Normal bowel sounds, soft,  nontender.  EXT: Extremities warm and without edema.  SKIN:  No acute rashes. Faint ecchymosis over left forearm, just proximal to wrist   NEUROLOGIC:  Grossly nonfocal.        Pertinent labs:      Results from last 7 days  Lab Units 07/12/18  0757 07/11/18  0728 07/10/18  0330   LN-WHITE BLOOD CELL COUNT thou/uL 6.9 5.2 6.2   LN-HEMOGLOBIN g/dL 11.9* 11.0* 10.1*   LN-PLATELET COUNT thou/uL 204 157 112*   LN-MONOCYTES RELATIVE PERCENT %  --  8 6   LN-MONOCYTES - REL (DIFF) % 9  --   --          Results from last 7 days  Lab Units 07/13/18  0525 07/12/18  0757 07/11/18  0728 07/10/18  0330  07/09/18  0341   LN-SODIUM mmol/L 139 137 138 137  --  131*   LN-POTASSIUM mmol/L 4.0 4.2 3.4* 3.7  < > 2.9*   LN-CHLORIDE mmol/L 106 105 108* 109*  --  101   LN-CO2 mmol/L 24 24 23 21*  --  20*   LN-BLOOD UREA NITROGEN mg/dL 9 7* 4* 6*  --  8   LN-CREATININE mg/dL 0.60 0.57* 0.54* 0.53*  --  0.62   LN-CALCIUM mg/dL 9.5 9.9 9.0 8.3*  --  8.0*   LN-ALBUMIN g/dL  --   --  2.7* 2.4*  --  2.5*   LN-PROTEIN TOTAL g/dL  --   --  6.6 5.6*  --  5.9*   LN-BILIRUBIN TOTAL mg/dL  --   --  0.3 0.4  --  1.1*   LN-ALKALINE PHOSPHATASE U/L  --   --  83 82  --  83   LN-ALT (SGPT) U/L  --   --  26 31  --  19   LN-AST (SGOT) U/L  --   --  16 45*  --  46*   < > = values in this interval not displayed.      MICROBIOLOGY DATA:  7/9 blood culture: MSSA  7/10 blood cultures ×2: S.aureus  7/11 blood cultures ×2: S.aureus  7/12 blood cx: ngtd  7/13 blood cx: pending      IMAGING/RADIOLOGY:  No results found.        Attestation:  I have reviewed today's Medications, Vital Signs, and Labs.

## 2021-06-19 NOTE — PROGRESS NOTES
Pharmacy Note - Admission Medication History    Pertinent Provider Information:      ______________________________________________________________________    Prior To Admission (PTA) med list completed and updated in EMR.       PTA Med List   Medication Sig Note Last Dose     lamoTRIgine (LAMICTAL) 25 MG tablet Take 25 mg by mouth daily. 7/8/2018: Refill records show 25mg #60 for 30days, filled 4/19/18, 11 refills, Dr JONATAN Fermin, filled at [facility].  Pt denies using twice daily, says she uses once daily when she remembers.  Not used in last 5 days per pt. >5days       Information source(s): Patient and CareEverywhere/SureScripts    Summary of Changes to PTA Med List  New: lamotrigine  Discontinued: none  Changed: none    Patient was asked about OTC/herbal products specifically.  PTA med list reflects this.    Based on the pharmacist s assessment, the PTA med list information appears reliable    Patient appears adherent: No: reason :  Patient decision    Allergies were reviewed, assessed, and updated with the patient.      Patient does not use any multi-dose medications prior to admission.    Thank you for the opportunity to participate in the care of this patient.    Caridad Brewer RPh  7/8/2018 9:12 PM

## 2021-06-19 NOTE — PROGRESS NOTES
Patient complained of PIV hurting; Surrounding skin is edamatous and reddened. PIV dcd; New PIV started. Pharmacy notified and told RN patient would need to have extracasation order set started and an antidote; will notify provider. Ice applied to left hand per pharmacy

## 2021-06-19 NOTE — CONSULTS
VASU spoke with Jules at [facility] Crisis Line ([phone]) regarding crisis stabilization services for pt.  SW was referred to [facility] Adult Intake and Referral ([phone]).  VASU spoke with Jhonatan Gómez.  He stated that since pt had a PMAP plan ([facility]), that they could not assist pt with obtaining MI/CD treatment resources.  If pt had straight MA or no insurance, then they could assist her.  Jhonatan informed VASU that maybe the Rule 25  from [facility] could work with [facility] to assist pt with getting the requested services.  VASU then left a voicemail message for Noris at [facility] regarding getting another appointment set up for pt to complete her Rule 25 assessment.      DUSTIN Cam, LGSW 07/10/18 4:35 PM

## 2021-06-19 NOTE — PROGRESS NOTES
"  Clinical Nutrition Therapy Assessment Note      Reason for Assessment:   Lucy Hernández is a 32 y.o. female assessed by the registered Dietitian for length of stay screen. Pt admitted with Superficial phlebitis with clot in the right forearm, sepsis, polysubstance abuse, IV drug abuse, epilepsy    Nutrition History:  Information obtained from patient and chart.  Patients diet prior to admission has been regular. Recent food/fluid intake has been good. Patient has been consuming the following supplements none. Patient has the following cultural/Religion food needs or preferences: none  Patient has the following food allergies or intolerances: none    Current Nutrition Prescription:   Diet: Regular  IV dextrose or Fluids:  nafcillin (UNIPEN) continuous infusion Last Rate: 0.5 g/hr (07/17/18 0400)       Current Nutrition Intake:  Intake is good, taking in 100% of meals     Anthropometrics:  Height: 5' 5\" (165.1 cm)  Weight: 121 lb 11.2 oz (55.2 kg)  BMI (Calculated): 20.5  BMI indication: 18.5-24.9 normal weight  Ideal body weight 57kg +/- 10%  % Ideal body weight 96%  Weight History:  Wt Readings from Last 5 Encounters:   07/11/18 121 lb 11.2 oz (55.2 kg)   04/11/18 120 lb (54.4 kg)   07/17/17 125 lb (56.7 kg)   11/20/15 123 lb (55.8 kg)   11/14/15 131 lb 1 oz (59.4 kg)     She is down 10# in the last 3 years    Physical Findings:  The patient has the following physical signs which could indicate malnutrition: none noted        GI Status/Output:   WDL    Skin/Wound:  Ludwin score Ludwin Scale Score: 23      Medications:  Medications reviewed.    Labs:  Labs reviewed    Assessed Nutritional Needs:  Assessment weight is 54kg, with a weight source of current    Estimated Energy Needs: 8490-5130 kcals daily per 25-30 kcal/kg    Estimated Protein Needs: 54-81 g daily, 1-1.5 g/kg.    Estimated Fluid Needs: 1620 mls daily, 30 mls/kg    Malnutrition: Not noted    Nutrition Risk Level: no risk    Monitoring:  Will f/u per " LOS  See Care Plan for Problems, Goals, and Interventions.

## 2021-06-19 NOTE — CONSULTS
"    INITIAL PSYCHIATRIC CONSULTATION  This note was created with the help of Dragon dictation system. Grammatical and typing errors are not intentional.                REASON FOR REQUEST: Behavioral, drug withdrawal    ASSESSMENT/RECOMMENDATIONS/PLAN :     Polysubstance abuse & withdrawal: U tox positive for THC, amphetamine, opioids, methadone and alcohol.  Adjustment disorder with anxiety and depression exacerbated due to opioid withdrawal.  Opioid abuse, dependence and withdrawal.  Substance-induced mood disorder cannot be excluded.  Encephalopathy in the context of polysubstance withdrawal/opioid abuse with intoxication delirium: Resolved    Recommendations:  Discontinue Valium.    COWS protocol.  Lamictal 25 mg daily.  Neurontin 300 mg three times a day.  Vistaril 25 mg 3 times daily.  Seroquel 25 mg at bedtime ×2 days.  Ativan 1 mg 3 times daily ×2 days  Rule 25 assessment.     MENTAL STATUS EXAMINATION:   Appearance: Stated age, cantankerous.        Behavior: Good eye contact. No bizarre ideations, no ideas of reference.  Speech: Coherent.  Thought Process: Organized, Clear.  Thought content: Does not show any hallucinations, delusions, paranoia, or thoughts of dying. Patient does not show any psychosis nor responding to internally generated stimuli.   Thought Formation: No loosening of associations. No flight of ideas.   Judgment: Depressed in regards to her opioid use.  Insight : Fair.  Attention : Sufficent  Memory: Adequate for both long and short term recall.   Fund Of Knowledge: Sufficent  Affect:Blunted.   Mood:Anxious.  Alert: Awake.  Orientation: X 3.  Comprehension: Adequate  Generative thought content:Sufficient  Language: Intact  Gait and ambulation: No rigidity, no tonal abnormalities    Vital Signs: /72 (Patient Position: Semi-tabares)  Pulse 90  Temp 98  F (36.7  C) (Oral)   Resp 16  Ht 5' 5\" (1.651 m)  Wt 130 lb 4.8 oz (59.1 kg)  LMP 06/08/2018  SpO2 100%  BMI 21.68 " kg/m2    HISTORY OF PRESENT ILLNESS:   Presenting history to include: [place] [facility] [place]:   Lucy Hernández is a 32 y.o. old female      - Met the patient while in the ED. She was tachycardic at that time. She was awake and interactive. For the last 3 days, nausea and vomiting. She also complained of low back pain. She did develop fever. She was having chills. She has no abdominal pain. No vaginal discharge. She has urinary frequency.   - In addition to above, she has history of seizure and she is not compliant. Per report, she had one today and was witnessed. Per GF of her father, she was in the kitchen when she had seizure. This last approximately a min, and she was postictal for 10 mins. She had been non compliant with medication. Her last dose was 2 days ago. She is taking lamotrigine. She stopped taking Keppra as it reacts bad with her psoariasis medications. - In the ED, temp was 101. WBC was 11.3. Lactic acid was minimally elevated. She was loaded with Vimpat per neurology. Did not recommend transfer. She was given IV zosyn  - .ROS --- (+) mild headache, none now. No dizziness. No weakness. No CP or SOB. No palpitations. No bleeding symptoms. No weight loss. Rest of 12 point ROS was reviewed and negative.     Upon assessment Patient was anxious and restless. She mentioned that she was going through withdrawal from Methadone, Oxycontin and heroin but could not tell me what amount and when the last time she had used these substances.  She later on said that she had used OxyContin and methadone 2 days ago.  She had also  mentioned that she had used heroin 2 or 3 times in last 5 days but later on she said that she did not and that she meant that she was using that in the past.  Patient has a history of anxiety and depression, had tried on Paxil 20 mg, and lorazepam 0.5 mg at bedtime as needed.  She denies any visual or auditory hallucinations, delusions, paranoia she denies any placido or hypomania.  She does not  "report of any apathy or anhedonia.  No lack of motivation or lack of interest.  She is currently anxious and restless at attributing that to opioid withdrawal.  She is asking for Valium and lorazepam frequently.    She has never been hospitalized on inpatient psychiatric unit.  She has seen a therapist when she was a teenager and was diagnosed with disruptive behavior NOS and rule out adjustment or depressive disorder.  Her chemical dependency history is significant for alcohol use starting at age 17, use of THC since high school and ongoing use of opiate.  She had been to treatment for methadone use 5 years ago.  Family history positive for anxiety and depression in her father and mother.  She was born and raised in [facility] and eaten very, had fairly normal growing up, parents  when she was 17 and her mother moved to [place].  She currently lives with her father and father's girlfriend.  No legal issues.  She is unemployed and has difficulty finding a job.    She has had anxiety going on for 2 years, and has struggled with insomnia.    Collateral information was obtained from patient's father at bedside who mentioned that patient had been drinking alcohol aside from using other substances.  He had tried to get her to seek help but to partial avail.  She lives with her father and father's girlfriend, has partial custody of her 3 children.  Review of Systems:As per HPI. Remainders of 12 point review of systems negative.  Psychiatric ROS: Patient denies any concerns.   Change of Interest/Anhedonia:  No  Appetite/Weight Changes: No  Concentration Changes:No  Impaired Energy:No  Impaired Sleep:No  Anxiety/Panic:No  Tearfulness:No  Depression:No  Psychosis: No  Irritability:No  SI/VI/HI: No,No,No  Zeina: No                /72 (Patient Position: Semi-tabares)  Pulse 90  Temp 98  F (36.7  C) (Oral)   Resp 16  Ht 5' 5\" (1.651 m)  Wt 130 lb 4.8 oz (59.1 kg)  LMP 06/08/2018  SpO2 100%  BMI 21.68 " kg/m2  Amphetamines (no units)   Date Value   07/08/2018 (!)    Screen Positive (Confirmation available on request)     Phencyclidine (no units)   Date Value   07/08/2018 Screen Negative     Barbiturates (no units)   Date Value   07/08/2018 Screen Negative     Cocaine Metabolite (no units)   Date Value   07/08/2018 Screen Negative     Oxycodone (no units)   Date Value   07/08/2018 Screen Negative     Creatinine, Urine (mg/dL)   Date Value   07/08/2018 251.3     THC (no units)   Date Value   07/08/2018 (!)    Screen Positive (Confirmation available on request)     Alcohol, Blood (mg/dL)   Date Value   07/08/2018 <10     Recent Results (from the past 24 hour(s))   Potassium   Result Value Ref Range    Potassium 4.1 3.5 - 5.0 mmol/L   Basic Metabolic Panel   Result Value Ref Range    Sodium 137 136 - 145 mmol/L    Potassium 3.7 3.5 - 5.0 mmol/L    Chloride 109 (H) 98 - 107 mmol/L    CO2 21 (L) 22 - 31 mmol/L    Anion Gap, Calculation 7 5 - 18 mmol/L    Glucose 101 70 - 125 mg/dL    Calcium 8.3 (L) 8.5 - 10.5 mg/dL    BUN 6 (L) 8 - 22 mg/dL    Creatinine 0.53 (L) 0.60 - 1.10 mg/dL    GFR MDRD Af Amer >60 >60 mL/min/1.73m2    GFR MDRD Non Af Amer >60 >60 mL/min/1.73m2   Hepatic Profile   Result Value Ref Range    Bilirubin, Total 0.4 0.0 - 1.0 mg/dL    Bilirubin, Direct 0.2 <=0.5 mg/dL    Protein, Total 5.6 (L) 6.0 - 8.0 g/dL    Albumin 2.4 (L) 3.5 - 5.0 g/dL    Alkaline Phosphatase 82 45 - 120 U/L    AST 45 (H) 0 - 40 U/L    ALT 31 0 - 45 U/L   HM1 (CBC with Diff)   Result Value Ref Range    WBC 6.2 4.0 - 11.0 thou/uL    RBC 3.45 (L) 3.80 - 5.40 mill/uL    Hemoglobin 10.1 (L) 12.0 - 16.0 g/dL    Hematocrit 31.9 (L) 35.0 - 47.0 %    MCV 93 80 - 100 fL    MCH 29.3 27.0 - 34.0 pg    MCHC 31.7 (L) 32.0 - 36.0 g/dL    RDW 13.4 11.0 - 14.5 %    Platelets 112 (L) 140 - 440 thou/uL    MPV 10.1 8.5 - 12.5 fL    Neutrophils % 79 (H) 50 - 70 %    Lymphocytes % 14 (L) 20 - 40 %    Monocytes % 6 2 - 10 %    Eosinophils % 1 0 - 6 %     Basophils % 0 0 - 2 %    Neutrophils Absolute 4.9 2.0 - 7.7 thou/uL    Lymphocytes Absolute 0.9 0.8 - 4.4 thou/uL    Monocytes Absolute 0.4 0.0 - 0.9 thou/uL    Eosinophils Absolute 0.1 0.0 - 0.4 thou/uL    Basophils Absolute 0.0 0.0 - 0.2 thou/uL   Echo Complete   Result Value Ref Range    LV volume diastolic 72.9 46 - 106 cm3    LV volume systolic 37.6 14 - 42 cm3    IVSd 0.885 0.6 - 0.9 cm    LVIDd 4.87 3.8 - 5.2 cm    LVIDs 3.33 2.2 - 3.5 cm    LVOT diam 2.1 cm    LVOT mean gradient 2 mmHg    LVOT peak VTI 18.3 cm    LVOT mean leigh 58.2 cm/s    LVOT peak leigh 89.4 cm/s    LVOT peak gradient 3 mmHg    LV PWd 1.01 0.6 - 0.9 cm    MV E' lat leigh 11.5 cm/s    MV E' med leigh 7.16 cm/s    AV mean leigh 74.8 cm/s    AV mean gradient 3 mmHg    AV VTI 21.5 cm    AV peak leigh 114 cm/s    AO root 2.9 cm    LA size 2.5 cm    MV decel slope 5930 mm/s2    MV decel slope 5930 mm/s2    MV peak A leigh 80 cm/s    MV peak E leigh 83.4 cm/s    TR peak leigh 187 cm/s    BSA 1.6 m2    Hieght 65 in    Weight 2084.8 lbs    /72 mmHg    HR 90 bpm    IVS/PW ratio 0.9     TR peak gradent 14.0 mmHg    LV FS 31.6 28 - 44 %    Echo LVEF calculated 48 55 - 75 %    LA volume 37.8 mL    LV mass 162.1 g    AV area 2.9 cm2    AV DIM IND leigh 0.8     MV E/A Ratio 1.0     LVOT area 3.46 cm2    LVOT SV 63.4 cm3    AV peak gradient 5.2 mmHg    LV systolic volume index 23.5 11 - 31 cm3/m2    LV diastolic volume index 45.6 34 - 74 cm3/m2    LA volume index 23.6 mL/m2    LV mass index 101.3 g/m2    LV SVi 39.6 ml/m2    TAPSE 2.2 cm    MV med E/e' ratio 11.6     MV lat E/e' ratio 7.3     LV CO 5.7 l/min    LV Ci 3.6 l/min/m2    LA area 2 16 cm2    LA area 1 10 cm2    Height 65.0 in    Weight 130 lbs    MV Avg E/e' Ratio 8.9 cm/s    LA length 3.6 cm    AV DIM IND VTI 0.9     Echo LVEF Estimated 50 %     Recent Results (from the past 72 hour(s))   ECG 12 lead nursing unit performed    Collection Time: 07/08/18  7:36 PM   Result Value Ref Range    SYSTOLIC BLOOD  PRESSURE 133 mmHg    DIASTOLIC BLOOD PRESSURE 84 mmHg    VENTRICULAR RATE 143 BPM    ATRIAL RATE 143 BPM    P-R INTERVAL 150 ms    QRS DURATION 92 ms    Q-T INTERVAL 266 ms    QTC CALCULATION (BEZET) 410 ms    P Axis 65 degrees    R AXIS -32 degrees    T AXIS 47 degrees    MUSE DIAGNOSIS       Sinus tachycardia  Possible Left atrial enlargement  Left axis deviation  Nonspecific ST abnormality  Abnormal ECG  When compared with ECG of 11-APR-2018 21:14,  Vent. rate has increased BY  48 BPM  Confirmed by ELLIE VICKERS MD LOC: ([id number]) on 7/9/2018 9:12:43 AM     Urinalysis-UC if Indicated    Collection Time: 07/08/18  7:47 PM   Result Value Ref Range    Color, UA Yellow Colorless, Yellow, Straw, Light Yellow    Clarity, UA Hazy (!) Clear    Glucose, UA Negative Negative    Bilirubin, UA Negative Negative    Ketones,  mg/dL (!) Negative    Specific Gravity, UA 1.030 1.001 - 1.030    Blood, UA Small (!) Negative    pH, UA 6.0 4.5 - 8.0    Protein, UA >=500 mg/dL (!) Negative mg/dL    Urobilinogen, UA <2.0 E.U./dL <2.0 E.U./dL, 2.0 E.U./dL    Nitrite, UA Negative Negative    Leukocytes, UA Negative Negative    Bacteria, UA Many (!) None Seen hpf    RBC, UA 0-2 None Seen, 0-2 hpf    WBC, UA 10-25 (!) None Seen, 0-5 hpf    Squam Epithel, UA >100 (!) None Seen, 0-5 lpf    Mucus, UA Many (!) None Seen lpf    Hyaline Casts, UA  (!) 0-5, None Seen lpf   Drug Abuse 1+, Urine    Collection Time: 07/08/18  7:47 PM   Result Value Ref Range    Amphetamines (!) Screen Negative     Screen Positive (Confirmation available on request)    Benzodiazepines Screen Negative Screen Negative    Opiates (!) Screen Negative     Screen Positive (Confirmation available on request)    Phencyclidine Screen Negative Screen Negative    THC (!) Screen Negative     Screen Positive (Confirmation available on request)    Barbiturates Screen Negative Screen Negative    Cocaine Metabolite Screen Negative Screen Negative    Methadone (!) Screen  Negative     Screen Positive (Confirmation available on request)    Oxycodone Screen Negative Screen Negative    Creatinine, Urine 251.3 mg/dL   Culture, Urine    Collection Time: 07/08/18  7:47 PM   Result Value Ref Range    Culture Mixture of urogenital organisms    Comprehensive Metabolic Panel    Collection Time: 07/08/18  7:57 PM   Result Value Ref Range    Sodium 128 (L) 136 - 145 mmol/L    Potassium 3.1 (L) 3.5 - 5.0 mmol/L    Chloride 94 (L) 98 - 107 mmol/L    CO2 21 (L) 22 - 31 mmol/L    Anion Gap, Calculation 13 5 - 18 mmol/L    Glucose 140 (H) 70 - 125 mg/dL    BUN 9 8 - 22 mg/dL    Creatinine 0.78 0.60 - 1.10 mg/dL    GFR MDRD Af Amer >60 >60 mL/min/1.73m2    GFR MDRD Non Af Amer >60 >60 mL/min/1.73m2    Bilirubin, Total 1.5 (H) 0.0 - 1.0 mg/dL    Calcium 9.4 8.5 - 10.5 mg/dL    Protein, Total 7.8 6.0 - 8.0 g/dL    Albumin 3.5 3.5 - 5.0 g/dL    Alkaline Phosphatase 98 45 - 120 U/L    AST 24 0 - 40 U/L    ALT 15 0 - 45 U/L   APTT    Collection Time: 07/08/18  7:57 PM   Result Value Ref Range    PTT 32 24 - 37 seconds   INR    Collection Time: 07/08/18  7:57 PM   Result Value Ref Range    INR 1.24 (H) 0.90 - 1.10   Lactic Acid    Collection Time: 07/08/18  7:57 PM   Result Value Ref Range    Lactic Acid 2.3 (H) 0.5 - 2.2 mmol/L   Type and Screen    Collection Time: 07/08/18  7:57 PM   Result Value Ref Range    ABORh A POS     Antibody Screen Negative Negative   Procalcitonin    Collection Time: 07/08/18  7:57 PM   Result Value Ref Range    Procalcitonin 4.10 (H) 0.00 - 0.49 ng/mL   ETOH Level    Collection Time: 07/08/18  7:57 PM   Result Value Ref Range    Alcohol, Blood <10 None detected mg/dL   HCG, Qual    Collection Time: 07/08/18  7:57 PM   Result Value Ref Range    Beta hCG Qualitative Negative Negative   HM1 (CBC with Diff)    Collection Time: 07/08/18  7:57 PM   Result Value Ref Range    WBC 11.3 (H) 4.0 - 11.0 thou/uL    RBC 3.95 3.80 - 5.40 mill/uL    Hemoglobin 11.7 (L) 12.0 - 16.0 g/dL     Hematocrit 34.5 (L) 35.0 - 47.0 %    MCV 87 80 - 100 fL    MCH 29.6 27.0 - 34.0 pg    MCHC 33.9 32.0 - 36.0 g/dL    RDW 13.2 11.0 - 14.5 %    Platelets 133 (L) 140 - 440 thou/uL    MPV 9.5 8.5 - 12.5 fL    Neutrophils % 94 (H) 50 - 70 %    Lymphocytes % 3 (L) 20 - 40 %    Monocytes % 3 2 - 10 %    Eosinophils % 0 0 - 6 %    Basophils % 0 0 - 2 %    Neutrophils Absolute 10.5 (H) 2.0 - 7.7 thou/uL    Lymphocytes Absolute 0.4 (L) 0.8 - 4.4 thou/uL    Monocytes Absolute 0.3 0.0 - 0.9 thou/uL    Eosinophils Absolute 0.0 0.0 - 0.4 thou/uL    Basophils Absolute 0.0 0.0 - 0.2 thou/uL   Lipase    Collection Time: 07/08/18  7:57 PM   Result Value Ref Range    Lipase <4 0 - 52 U/L   Levetiracetam [Keppra ]    Collection Time: 07/08/18  8:09 PM   Result Value Ref Range    Levetiracetam <2.0 (L) 6.0 - 46.0 ug/mL   Lactic Acid    Collection Time: 07/08/18 10:00 PM   Result Value Ref Range    Lactic Acid 0.6 0.5 - 2.2 mmol/L   Comprehensive metabolic panel    Collection Time: 07/09/18  3:41 AM   Result Value Ref Range    Sodium 131 (L) 136 - 145 mmol/L    Potassium 2.9 (L) 3.5 - 5.0 mmol/L    Chloride 101 98 - 107 mmol/L    CO2 20 (L) 22 - 31 mmol/L    Anion Gap, Calculation 10 5 - 18 mmol/L    Glucose 103 70 - 125 mg/dL    BUN 8 8 - 22 mg/dL    Creatinine 0.62 0.60 - 1.10 mg/dL    GFR MDRD Af Amer >60 >60 mL/min/1.73m2    GFR MDRD Non Af Amer >60 >60 mL/min/1.73m2    Bilirubin, Total 1.1 (H) 0.0 - 1.0 mg/dL    Calcium 8.0 (L) 8.5 - 10.5 mg/dL    Protein, Total 5.9 (L) 6.0 - 8.0 g/dL    Albumin 2.5 (L) 3.5 - 5.0 g/dL    Alkaline Phosphatase 83 45 - 120 U/L    AST 46 (H) 0 - 40 U/L    ALT 19 0 - 45 U/L   HM1 (CBC with Diff)    Collection Time: 07/09/18  3:41 AM   Result Value Ref Range    WBC 8.2 4.0 - 11.0 thou/uL    RBC 3.25 (L) 3.80 - 5.40 mill/uL    Hemoglobin 9.6 (L) 12.0 - 16.0 g/dL    Hematocrit 29.0 (L) 35.0 - 47.0 %    MCV 89 80 - 100 fL    MCH 29.5 27.0 - 34.0 pg    MCHC 33.1 32.0 - 36.0 g/dL    RDW 13.3 11.0 - 14.5 %     Platelets 97 (L) 140 - 440 thou/uL    MPV 9.6 8.5 - 12.5 fL   Lactic Acid    Collection Time: 07/09/18  3:41 AM   Result Value Ref Range    Lactic Acid 0.7 0.5 - 2.2 mmol/L   Manual Differential    Collection Time: 07/09/18  3:41 AM   Result Value Ref Range    Total Neutrophils % 85 (H) 50 - 70 %    Lymphocytes % 5 (L) 20 - 40 %    Monocytes % 6 2 - 10 %    Eosinophils %  0 0 - 6 %    Basophils % 0 0 - 2 %    Metamyelocytes % 4 (H) <=1 %    Total Neutrophils Absolute 7.0 2.0 - 7.7 thou/ul    Lymphocytes Absolute 0.4 (L) 0.8 - 4.4 thou/uL    Monocytes Absolute 0.5 0.0 - 0.9 thou/uL    Eosinophils Absolute 0.0 0.0 - 0.4 thou/uL    Basophils Absolute 0.0 0.0 - 0.2 thou/uL    Metamyelocytes Absolute 0.3 (H) <=0.1 thou/uL    Platelet Estimate Decreased (!) Normal   Magnesium    Collection Time: 07/09/18  3:41 AM   Result Value Ref Range    Magnesium 1.5 (L) 1.8 - 2.6 mg/dL   Culture, Blood Positive Work-up    Collection Time: 07/09/18  3:41 AM   Result Value Ref Range    Gram Stain Result Gram positive cocci in clusters    Potassium    Collection Time: 07/09/18  6:35 PM   Result Value Ref Range    Potassium 4.1 3.5 - 5.0 mmol/L   Basic Metabolic Panel    Collection Time: 07/10/18  3:30 AM   Result Value Ref Range    Sodium 137 136 - 145 mmol/L    Potassium 3.7 3.5 - 5.0 mmol/L    Chloride 109 (H) 98 - 107 mmol/L    CO2 21 (L) 22 - 31 mmol/L    Anion Gap, Calculation 7 5 - 18 mmol/L    Glucose 101 70 - 125 mg/dL    Calcium 8.3 (L) 8.5 - 10.5 mg/dL    BUN 6 (L) 8 - 22 mg/dL    Creatinine 0.53 (L) 0.60 - 1.10 mg/dL    GFR MDRD Af Amer >60 >60 mL/min/1.73m2    GFR MDRD Non Af Amer >60 >60 mL/min/1.73m2   Hepatic Profile    Collection Time: 07/10/18  3:30 AM   Result Value Ref Range    Bilirubin, Total 0.4 0.0 - 1.0 mg/dL    Bilirubin, Direct 0.2 <=0.5 mg/dL    Protein, Total 5.6 (L) 6.0 - 8.0 g/dL    Albumin 2.4 (L) 3.5 - 5.0 g/dL    Alkaline Phosphatase 82 45 - 120 U/L    AST 45 (H) 0 - 40 U/L    ALT 31 0 - 45 U/L   1  (CBC with Diff)    Collection Time: 07/10/18  3:30 AM   Result Value Ref Range    WBC 6.2 4.0 - 11.0 thou/uL    RBC 3.45 (L) 3.80 - 5.40 mill/uL    Hemoglobin 10.1 (L) 12.0 - 16.0 g/dL    Hematocrit 31.9 (L) 35.0 - 47.0 %    MCV 93 80 - 100 fL    MCH 29.3 27.0 - 34.0 pg    MCHC 31.7 (L) 32.0 - 36.0 g/dL    RDW 13.4 11.0 - 14.5 %    Platelets 112 (L) 140 - 440 thou/uL    MPV 10.1 8.5 - 12.5 fL    Neutrophils % 79 (H) 50 - 70 %    Lymphocytes % 14 (L) 20 - 40 %    Monocytes % 6 2 - 10 %    Eosinophils % 1 0 - 6 %    Basophils % 0 0 - 2 %    Neutrophils Absolute 4.9 2.0 - 7.7 thou/uL    Lymphocytes Absolute 0.9 0.8 - 4.4 thou/uL    Monocytes Absolute 0.4 0.0 - 0.9 thou/uL    Eosinophils Absolute 0.1 0.0 - 0.4 thou/uL    Basophils Absolute 0.0 0.0 - 0.2 thou/uL   Echo Complete    Collection Time: 07/10/18 10:32 AM   Result Value Ref Range    LV volume diastolic 72.9 46 - 106 cm3    LV volume systolic 37.6 14 - 42 cm3    IVSd 0.885 0.6 - 0.9 cm    LVIDd 4.87 3.8 - 5.2 cm    LVIDs 3.33 2.2 - 3.5 cm    LVOT diam 2.1 cm    LVOT mean gradient 2 mmHg    LVOT peak VTI 18.3 cm    LVOT mean leigh 58.2 cm/s    LVOT peak leigh 89.4 cm/s    LVOT peak gradient 3 mmHg    LV PWd 1.01 0.6 - 0.9 cm    MV E' lat leigh 11.5 cm/s    MV E' med leigh 7.16 cm/s    AV mean leigh 74.8 cm/s    AV mean gradient 3 mmHg    AV VTI 21.5 cm    AV peak leigh 114 cm/s    AO root 2.9 cm    LA size 2.5 cm    MV decel slope 5930 mm/s2    MV decel slope 5930 mm/s2    MV peak A leigh 80 cm/s    MV peak E leigh 83.4 cm/s    TR peak leigh 187 cm/s    BSA 1.6 m2    Hieght 65 in    Weight 2084.8 lbs    /72 mmHg    HR 90 bpm    IVS/PW ratio 0.9     TR peak gradent 14.0 mmHg    LV FS 31.6 28 - 44 %    Echo LVEF calculated 48 55 - 75 %    LA volume 37.8 mL    LV mass 162.1 g    AV area 2.9 cm2    AV DIM IND leigh 0.8     MV E/A Ratio 1.0     LVOT area 3.46 cm2    LVOT SV 63.4 cm3    AV peak gradient 5.2 mmHg    LV systolic volume index 23.5 11 - 31 cm3/m2    LV diastolic volume  index 45.6 34 - 74 cm3/m2    LA volume index 23.6 mL/m2    LV mass index 101.3 g/m2    LV SVi 39.6 ml/m2    TAPSE 2.2 cm    MV med E/e' ratio 11.6     MV lat E/e' ratio 7.3     LV CO 5.7 l/min    LV Ci 3.6 l/min/m2    LA area 2 16 cm2    LA area 1 10 cm2    Height 65.0 in    Weight 130 lbs    MV Avg E/e' Ratio 8.9 cm/s    LA length 3.6 cm    AV DIM IND VTI 0.9     Echo LVEF Estimated 50 %       PMH:   Past Medical History:   Diagnosis Date     Methamphetamine abuse     injectable method     Psoriasis      Pyelonephritis      Seizures (H)      Tobacco abuse      UTI (urinary tract infection)            Current Medications:Scheduled Meds:    lacosamide (VIMPAT) IVPB  50 mg Intravenous Q12H     lamoTRIgine  25 mg Oral DAILY     magnesium oxide  400 mg Oral TID     piperacillin-tazobactam  3.375 g Intravenous Q8H     sodium chloride  3 mL Intravenous Line Care     vancomycin  15 mg/kg Intravenous Q12H     Continuous Infusions:    nacl 0.9% 125 mL/hr (07/10/18 0800)     PRN Meds:.acetaminophen, acetaminophen, aluminum-magnesium hydroxide-simethicone, bisacodyl, [DISCONTINUED] diazePAM **OR** diazePAM **OR** diazePAM, diazePAM **OR** [DISCONTINUED] diazePAM **OR** [DISCONTINUED] diazePAM, gabapentin, HYDROmorphone, LORazepam, magnesium hydroxide, melatonin, naloxone **OR** naloxone, ondansetron **OR** ondansetron                Family History: PERSONALLY REVIEWED.No family history on file.  Pertinent Family hx not pertinent to Chief Complaint or reason for visit.     Social History:  PERSONALLY REVIEWED.  Social History     Social History     Marital status: Single     Spouse name: N/A     Number of children: 3     Years of education: N/A     Occupational History     [employer]     Social History Main Topics     Smoking status: Current Every Day Smoker     Packs/day: 0.50     Smokeless tobacco: Not on file     Alcohol use 0.5 - 1.0 oz/week     1 - 2 Standard drinks or equivalent per week       Comment: 1-2 every few weeks     Drug use: Yes      Comment: opiates     Sexual activity: Yes     Partners: Male     Birth control/ protection: Condom     Other Topics Concern     Not on file     Social History Narrative    Lives with father.              Allergies as of 06/01/2014 Reviewed     Review of Systems:As per HPI. Remainders of 12 point review of systems negative.    Review of Pertinent Laboratory:      PERSONALLY REVIEWED.    Physical Exam: Temp:  [97.5  F (36.4  C)-98.5  F (36.9  C)] 98  F (36.7  C)  Heart Rate:  [] 90  Resp:  [16-18] 16  BP: ()/(53-73) 118/72   Vitals: reviewed in chart     Physical exam as per medical team: reviewed in chart      diagnoses, risk and benefits of medications discussed with staff. Care coordination with care management team.   Thank you for this consultation.       Lauren Nguyen; NP  [facility]        This note was created with the help of Dragon dictation system. Grammatical and typing errors are not intentional.

## 2021-06-19 NOTE — PROGRESS NOTES
Infectious Diseases Progress Note  [facility]    Date of visit: 2018    ASSESSMENT:  1. MSSA bacteremia.  Blood cultures on positive from -.  Repeat cultures pending.  Initial Vanco trough showing sub-therapeutic levels.  HARVEY negative for vegetations on .  2. IV drug abuse.  Active drug use complicates needed IV antibiotic therapy  3. History of seizures    PLAN:  1. Discontinue vancomycin  2. Start nafcillin 2 g IV every 4 hours  3. Daily blood cultures to ensure clearance  4. I would not recommend PICC line or home IV therapy due to active drug use    Yousif Simons MD  [facility]  Direct messaging: [employer] Paging  On-Call ID provider: [phone], option: 9    ==================================================================    SUBJECTIVE / INTERVAL HISTORY:  No events overnight.  HARVEY performed this morning.  Tolerated procedure.    Vancomycin trough returned this morning low at 5.6.  Dosing readjusted this morning.    ROS:   Afebrile, no rashes    Current Antimicrobials:  Vancomycin 7/10-       Previous:  Zosyn -7/10      OBJECTIVE:  Vitals:    18 1522   BP: 126/68   Pulse: (!) 112   Resp: 16   Temp:    SpO2: 99%       Temp (24hrs), Av.2  F (36.8  C), Min:97.8  F (36.6  C), Max:98.8  F (37.1  C)        Intake/Output Summary (Last 24 hours) at 18 1526  Last data filed at 18 1522   Gross per 24 hour   Intake             1784 ml   Output                0 ml   Net             1784 ml        GENERAL:  well-developed, well-nourished, lying in bed in no acute distress.   HENT:  Head is normocephalic, atraumatic.   EYES:  Eyes have anicteric sclerae.    LUNGS:  Nl respiratory pattern  CARDIOVASCULAR:  Regular rate and rhythm with no murmurs, gallops or rubs.  ABDOMEN:  Normal bowel sounds, soft, nontender.  EXT: Extremities warm and without edema.  SKIN:  No acute rashes.    NEUROLOGIC:  Grossly nonfocal.        Pertinent labs:      Results  from last 7 days  Lab Units 07/12/18  0757 07/11/18  0728 07/10/18  0330   LN-WHITE BLOOD CELL COUNT thou/uL 6.9 5.2 6.2   LN-HEMOGLOBIN g/dL 11.9* 11.0* 10.1*   LN-PLATELET COUNT thou/uL 204 157 112*   LN-MONOCYTES RELATIVE PERCENT %  --  8 6   LN-MONOCYTES - REL (DIFF) % 9  --   --          Results from last 7 days  Lab Units 07/12/18  0757 07/11/18  0728 07/10/18  0330  07/09/18  0341   LN-SODIUM mmol/L 137 138 137  --  131*   LN-POTASSIUM mmol/L 4.2 3.4* 3.7  < > 2.9*   LN-CHLORIDE mmol/L 105 108* 109*  --  101   LN-CO2 mmol/L 24 23 21*  --  20*   LN-BLOOD UREA NITROGEN mg/dL 7* 4* 6*  --  8   LN-CREATININE mg/dL 0.57* 0.54* 0.53*  --  0.62   LN-CALCIUM mg/dL 9.9 9.0 8.3*  --  8.0*   LN-ALBUMIN g/dL  --  2.7* 2.4*  --  2.5*   LN-PROTEIN TOTAL g/dL  --  6.6 5.6*  --  5.9*   LN-BILIRUBIN TOTAL mg/dL  --  0.3 0.4  --  1.1*   LN-ALKALINE PHOSPHATASE U/L  --  83 82  --  83   LN-ALT (SGPT) U/L  --  26 31  --  19   LN-AST (SGOT) U/L  --  16 45*  --  46*   < > = values in this interval not displayed.      MICROBIOLOGY DATA:  7/9 blood culture: MSSA  7/10 blood cultures ×2: GPC's in clusters  7/11 blood cultures ×2: GPC's in clusters      IMAGING/RADIOLOGY:  No results found.        Attestation:  I have reviewed today's Medications, Vital Signs, and Labs.

## 2021-06-19 NOTE — PROGRESS NOTES
Hospitalist Progress Note    Assessment/Plan    Principal Problem:    Sepsis (H)  Active Problems:    Methamphetamine abuse    Partial symptomatic epilepsy with complex partial seizures, not intractable, without status epilepticus (H)    Polysubstance abuse    Opioid abuse with intoxication delirium (H)    Adjustment disorder with mixed anxiety and depressed mood    Substance induced mood disorder (H)    32F with h/o IVDU, seizure d/o presented 7/8 with sepsis, found to have MSSA bacteremia.  Urine drug screen was positive for amphetamines, methadone, opiates, and marijuana.    MSSA bacteremia - 2/2 IVDU resulting in sepsis.  Patient is currently followed by ID and he is on nafcillin.  Fever curve has been stable, WBC is stable.  - HARVEY negative on 7/12 for vegetations  - following serial blood cultures, last positive on 7/11  - at least 2 weeks IV abx, ID following  - ID recommends against PICC line placement and discharge due to h/o IVDU    Anxiety/depression - patient c/o getting anxious as times  - redirect as able  - anxiolytics prn    Polysubstance abuse - including amphetamines, marijuana, methadone, opiates.  - Rule 25 assessment for commitment screening    H/o seizure d/o - she is not on seizure medications.  Perhaps drug induced?  - seizure precautions    Proph - patient is independently mobile, no chemical prophylaxis indicated    Dispo - pending bacteremia, Rule 25.  Recommend against discharging with IV access    Anticipated discharge date/Disposition: ?      Subjective  Patient is doing well.  No acute complaints, no new symptoms.  Denies fevers.    Objective    Vital signs in last 24 hours  Temp:  [97.8  F (36.6  C)-98.9  F (37.2  C)] 98.5  F (36.9  C)  Heart Rate:  [] 88  Resp:  [10-27] 16  BP: (104-126)/(62-81) 105/69 97% O2 Device: None (Room air) O2 Flow Rate (L/min): 2 L/min  Weight:   121 lb 11.2 oz (55.2 kg) Weight change:     Intake/Output last 3 shifts  I/O last 3 completed shifts:  In:  2424 [P.O.:920; I.V.:554; IV Piggyback:950]  Out: -   Body mass index is 20.25 kg/(m^2).    Physical Exam    General Appearance:    Alert, cooperative, no distress, appears stated age   Lungs:     CTA b/l   Cardiovascular:    S1 S2 RRR, no murmurs   Abdomen:     Soft, non-tender, bowel sounds active all four quadrants,     no masses, no organomegaly   Neurologic:   Oriented x 3, no focal deficits     Pertinent Labs   Lab Results: personally reviewed.     Results from last 7 days  Lab Units 07/13/18  0525 07/12/18  0757 07/11/18  0728 07/10/18  0330  07/09/18  0341   LN-SODIUM mmol/L 139 137 138 137  --  131*   LN-POTASSIUM mmol/L 4.0 4.2 3.4* 3.7  < > 2.9*   LN-CHLORIDE mmol/L 106 105 108* 109*  --  101   LN-CO2 mmol/L 24 24 23 21*  --  20*   LN-BLOOD UREA NITROGEN mg/dL 9 7* 4* 6*  --  8   LN-CREATININE mg/dL 0.60 0.57* 0.54* 0.53*  --  0.62   LN-CALCIUM mg/dL 9.5 9.9 9.0 8.3*  --  8.0*   LN-ALBUMIN g/dL  --   --  2.7* 2.4*  --  2.5*   LN-PROTEIN TOTAL g/dL  --   --  6.6 5.6*  --  5.9*   LN-BILIRUBIN TOTAL mg/dL  --   --  0.3 0.4  --  1.1*   LN-ALKALINE PHOSPHATASE U/L  --   --  83 82  --  83   LN-ALT (SGPT) U/L  --   --  26 31  --  19   LN-AST (SGOT) U/L  --   --  16 45*  --  46*   LN-MAGNESIUM mg/dL 2.0  --  1.7*  --   --  1.5*   < > = values in this interval not displayed.    Results from last 7 days  Lab Units 07/12/18  0757 07/11/18  0728 07/10/18  0330  07/08/18  1957   LN-WHITE BLOOD CELL COUNT thou/uL 6.9 5.2 6.2  < > 11.3*   LN-HEMOGLOBIN g/dL 11.9* 11.0* 10.1*  < > 11.7*   LN-HEMATOCRIT % 36.0 33.1* 31.9*  < > 34.5*   LN-PLATELET COUNT thou/uL 204 157 112*  < > 133*   LN-NEUTROPHILS RELATIVE PERCENT %  --  68 79*  --  94*   LN-MONOCYTES RELATIVE PERCENT %  --  8 6  --  3   LN-MONOCYTES - REL (DIFF) % 9  --   --   < >  --    < > = values in this interval not displayed.            Medications  Scheduled Meds:    gabapentin  300 mg Oral TID     hydrOXYzine pamoate  25 mg Oral TID     lacosamide  50 mg Oral  BID     lamoTRIgine  25 mg Oral DAILY     nafcillin IV  2 g Intravenous Q4H     sodium chloride  3 mL Intravenous Line Care     Continuous Infusions:  PRN Meds:.acetaminophen, acetaminophen, aluminum-magnesium hydroxide-simethicone, bisacodyl, cloNIDine HCl, diazePAM, HYDROmorphone, LORazepam, magnesium hydroxide, melatonin, naloxone **OR** naloxone, ondansetron **OR** ondansetron    Pertinent Radiology   Radiology Results: HARVEY report reviewed by me.      Andrew Hartley MD  Internal Medicine Hospitalist  7/13/2018

## 2021-06-19 NOTE — PROGRESS NOTES
Progress Note    Assessment/Plan  Horacio notes reviewed.  Per report patient is stable at this point.    Principal Problem:    Sepsis (H)  Active Problems:    Methamphetamine abuse    Partial symptomatic epilepsy with complex partial seizures, not intractable, without status epilepticus (H)    Polysubstance abuse    Opioid abuse with intoxication delirium (H)    Adjustment disorder with mixed anxiety and depressed mood    Substance induced mood disorder (H)      Subjective  As above.  Patient aware she is to be transferred to [facility].  Objective    Vital signs in last 24 hours  Temp:  [97.5  F (36.4  C)-98.4  F (36.9  C)] 98.3  F (36.8  C)  Heart Rate:  [] 97  Resp:  [16-18] 16  BP: (108-121)/(67-96) 108/69  Weight:   121 lb 11.2 oz (55.2 kg)    Intake/Output last 3 shifts  I/O last 3 completed shifts:  In: 780 [P.O.:780]  Out: -   Intake/Output this shift:  I/O this shift:  In: 240 [P.O.:240]  Out: -       Physical Exam  No change by report    Pertinent Labs   Lab Results   Component Value Date    WBC 6.9 07/12/2018    HGB 11.9 (L) 07/12/2018    HCT 36.0 07/12/2018    MCV 89 07/12/2018     07/12/2018             Pertinent Radiology     No results found.        Lucius Dong

## 2021-06-19 NOTE — PROGRESS NOTES
"Spiritual Care Note    Spiritual Assessment: Pt is listed as Yarsanism    Care Provided: Brief visit with pt's dad (Shabbir) in the hallway. Also visit with pt alone in her room. Discussed pt's health and events leading to her hospitalization. Pt is \"a little worried\" about her Rule 25 tomorrow. Will follow up with her after that. Writer offered comfort and support, also talked about ways to maintain sobriety.    Plan of Care: Our department will follow as needed or as requested.    tima Terrell MDorina., BCC        "

## 2021-07-01 NOTE — PROCEDURES
"Procedures by Noris Yun, RN at 7/14/2018  4:28 PM     Author: Noris Yun RN Service: -- Author Type: Registered Nurse    Filed: 7/14/2018  4:34 PM Date of Service: 7/14/2018  4:28 PM Status: Signed    : Noris Yun RN (Registered Nurse)     Procedure Orders    1. Insert PICC [[id number]] ordered by Jim Banks MD at 07/14/18 1503           Procedures    1. PICC [[id number] (Custom)]             PICC Line Insertion Procedure Note  Pt. Name: Lucy Hernández  MRN:        [id number]    Procedure: Insertion of a  Single Lumen  4 fr  Bard SOLO (valved) Power PICC, Lot number [id number]    Indications: antibiotics    Contraindications : none    Procedure Details   Patient identified with 2 identifiers and \"Time Out\" conducted.  .     Central line insertion bundle followed: hand hygeine performed prior to procedure, site cleansed with cholraprep, hat, mask, sterile gloves,sterile gown worn, patient draped with maximum barrier head to toe drape, sterile field maintained.    The vein was assessed and found to be compressible and of adequate size. 2 ml 1% Lidocaine administered sq to the insertion site. A 4 Fr PICC was inserted into the basilic vein of the right arm with ultrasound guidance. 1 attempt(s) required to access vein.   Catheter threaded without difficulty. Good blood return noted.    Modified Seldinger Technique used for insertion.    The 8 sharps that are included in the PICC insertion kit were accounted for and disposed of in the sharps container prior to breakdown of the sterile field.    Catheter secured with Statlock, biopatch and Tegaderm dressing applied.    Findings:  Total catheter length  41 cm, with 1 cm exposed. Mid upper arm circumference is 27 cm. Catheter was flushed with 10 cc NS. Patient  tolerated procedure well.    Tip placement verified by 3CG . Tip placement in the SVC.    CLABSI prevention brochure left at bedside.    Patient's primary RN notified PICC is ready for " use.    Comments:          Noris Yun, RN    [facility]  [phone]

## 2021-08-03 PROBLEM — F10.20 ALCOHOL DEPENDENCE, CONTINUOUS DRINKING BEHAVIOR (H): Status: RESOLVED | Noted: 2018-07-31 | Resolved: 2018-12-10

## 2021-08-03 PROBLEM — F15.10 METHAMPHETAMINE ABUSE (H): Status: RESOLVED | Noted: 2018-07-10 | Resolved: 2018-07-31

## 2021-08-03 PROBLEM — A41.9 SEPSIS (H): Status: RESOLVED | Noted: 2018-07-08 | Resolved: 2018-12-10
